# Patient Record
Sex: MALE | Race: WHITE | Employment: UNEMPLOYED | URBAN - METROPOLITAN AREA
[De-identification: names, ages, dates, MRNs, and addresses within clinical notes are randomized per-mention and may not be internally consistent; named-entity substitution may affect disease eponyms.]

---

## 2019-03-05 ENCOUNTER — APPOINTMENT (OUTPATIENT)
Dept: GENERAL RADIOLOGY | Age: 34
End: 2019-03-05
Attending: NURSE PRACTITIONER
Payer: MEDICAID

## 2019-03-05 ENCOUNTER — HOSPITAL ENCOUNTER (OUTPATIENT)
Age: 34
Setting detail: OBSERVATION
Discharge: HOME | End: 2019-03-05
Attending: EMERGENCY MEDICINE | Admitting: INTERNAL MEDICINE
Payer: MEDICAID

## 2019-03-05 VITALS
HEART RATE: 127 BPM | SYSTOLIC BLOOD PRESSURE: 100 MMHG | OXYGEN SATURATION: 100 % | DIASTOLIC BLOOD PRESSURE: 55 MMHG | TEMPERATURE: 99 F | HEIGHT: 71 IN | BODY MASS INDEX: 33.6 KG/M2 | WEIGHT: 240 LBS | RESPIRATION RATE: 24 BRPM

## 2019-03-05 DIAGNOSIS — R00.0 TACHYCARDIA: ICD-10-CM

## 2019-03-05 DIAGNOSIS — R51.9 ACUTE NONINTRACTABLE HEADACHE, UNSPECIFIED HEADACHE TYPE: ICD-10-CM

## 2019-03-05 DIAGNOSIS — J11.1 INFLUENZA-LIKE ILLNESS: ICD-10-CM

## 2019-03-05 DIAGNOSIS — R50.9 FEVER, UNSPECIFIED FEVER CAUSE: Primary | ICD-10-CM

## 2019-03-05 DIAGNOSIS — R11.2 NAUSEA AND VOMITING, INTRACTABILITY OF VOMITING NOT SPECIFIED, UNSPECIFIED VOMITING TYPE: ICD-10-CM

## 2019-03-05 DIAGNOSIS — G90.A POSTURAL ORTHOSTATIC TACHYCARDIA SYNDROME: ICD-10-CM

## 2019-03-05 LAB
ALBUMIN SERPL-MCNC: 4.4 G/DL (ref 3.4–5)
ALBUMIN/GLOB SERPL: 1.3 {RATIO} (ref 0.8–1.7)
ALP SERPL-CCNC: 82 U/L (ref 45–117)
ALT SERPL-CCNC: 34 U/L (ref 16–61)
ANION GAP SERPL CALC-SCNC: 8 MMOL/L (ref 3–18)
APPEARANCE UR: CLEAR
AST SERPL-CCNC: 22 U/L (ref 15–37)
BASOPHILS # BLD: 0 K/UL (ref 0–0.1)
BASOPHILS NFR BLD: 0 % (ref 0–2)
BILIRUB SERPL-MCNC: 0.7 MG/DL (ref 0.2–1)
BILIRUB UR QL: NEGATIVE
BNP SERPL-MCNC: 58 PG/ML (ref 0–450)
BUN SERPL-MCNC: 9 MG/DL (ref 7–18)
BUN/CREAT SERPL: 8 (ref 12–20)
CALCIUM SERPL-MCNC: 9 MG/DL (ref 8.5–10.1)
CHLORIDE SERPL-SCNC: 102 MMOL/L (ref 100–108)
CK MB CFR SERPL CALC: NORMAL % (ref 0–4)
CK MB SERPL-MCNC: <1 NG/ML (ref 5–25)
CK SERPL-CCNC: 56 U/L (ref 39–308)
CO2 SERPL-SCNC: 27 MMOL/L (ref 21–32)
COLOR UR: YELLOW
CREAT SERPL-MCNC: 1.18 MG/DL (ref 0.6–1.3)
DIFFERENTIAL METHOD BLD: ABNORMAL
EOSINOPHIL # BLD: 0.1 K/UL (ref 0–0.4)
EOSINOPHIL NFR BLD: 1 % (ref 0–5)
ERYTHROCYTE [DISTWIDTH] IN BLOOD BY AUTOMATED COUNT: 13.2 % (ref 11.6–14.5)
FLUAV AG NPH QL IA: NEGATIVE
FLUBV AG NOSE QL IA: NEGATIVE
GLOBULIN SER CALC-MCNC: 3.4 G/DL (ref 2–4)
GLUCOSE SERPL-MCNC: 105 MG/DL (ref 74–99)
GLUCOSE UR STRIP.AUTO-MCNC: NEGATIVE MG/DL
HCT VFR BLD AUTO: 49.1 % (ref 36–48)
HGB BLD-MCNC: 16.5 G/DL (ref 13–16)
HGB UR QL STRIP: NEGATIVE
KETONES UR QL STRIP.AUTO: NEGATIVE MG/DL
LACTATE BLD-SCNC: 1.52 MMOL/L (ref 0.4–2)
LACTATE BLD-SCNC: 2.19 MMOL/L (ref 0.4–2)
LEUKOCYTE ESTERASE UR QL STRIP.AUTO: NEGATIVE
LYMPHOCYTES # BLD: 0.4 K/UL (ref 0.9–3.6)
LYMPHOCYTES NFR BLD: 5 % (ref 21–52)
MCH RBC QN AUTO: 29.4 PG (ref 24–34)
MCHC RBC AUTO-ENTMCNC: 33.6 G/DL (ref 31–37)
MCV RBC AUTO: 87.5 FL (ref 74–97)
MONOCYTES # BLD: 0.6 K/UL (ref 0.05–1.2)
MONOCYTES NFR BLD: 7 % (ref 3–10)
NEUTS SEG # BLD: 7.5 K/UL (ref 1.8–8)
NEUTS SEG NFR BLD: 87 % (ref 40–73)
NITRITE UR QL STRIP.AUTO: NEGATIVE
PH UR STRIP: 8 [PH] (ref 5–8)
PLATELET # BLD AUTO: 208 K/UL (ref 135–420)
PMV BLD AUTO: 11.1 FL (ref 9.2–11.8)
POTASSIUM SERPL-SCNC: 3.7 MMOL/L (ref 3.5–5.5)
PROT SERPL-MCNC: 7.8 G/DL (ref 6.4–8.2)
PROT UR STRIP-MCNC: NEGATIVE MG/DL
RBC # BLD AUTO: 5.61 M/UL (ref 4.7–5.5)
SODIUM SERPL-SCNC: 137 MMOL/L (ref 136–145)
SP GR UR REFRACTOMETRY: 1.01 (ref 1–1.03)
TROPONIN I SERPL-MCNC: <0.02 NG/ML (ref 0–0.04)
UROBILINOGEN UR QL STRIP.AUTO: 0.2 EU/DL (ref 0.2–1)
WBC # BLD AUTO: 8.5 K/UL (ref 4.6–13.2)

## 2019-03-05 PROCEDURE — 83605 ASSAY OF LACTIC ACID: CPT

## 2019-03-05 PROCEDURE — 93005 ELECTROCARDIOGRAM TRACING: CPT

## 2019-03-05 PROCEDURE — 74011250637 HC RX REV CODE- 250/637: Performed by: NURSE PRACTITIONER

## 2019-03-05 PROCEDURE — 96375 TX/PRO/DX INJ NEW DRUG ADDON: CPT

## 2019-03-05 PROCEDURE — 99218 HC RM OBSERVATION: CPT

## 2019-03-05 PROCEDURE — 85025 COMPLETE CBC W/AUTO DIFF WBC: CPT

## 2019-03-05 PROCEDURE — 81003 URINALYSIS AUTO W/O SCOPE: CPT

## 2019-03-05 PROCEDURE — 80053 COMPREHEN METABOLIC PANEL: CPT

## 2019-03-05 PROCEDURE — 74011000250 HC RX REV CODE- 250: Performed by: NURSE PRACTITIONER

## 2019-03-05 PROCEDURE — 96361 HYDRATE IV INFUSION ADD-ON: CPT

## 2019-03-05 PROCEDURE — 87040 BLOOD CULTURE FOR BACTERIA: CPT

## 2019-03-05 PROCEDURE — 87804 INFLUENZA ASSAY W/OPTIC: CPT

## 2019-03-05 PROCEDURE — 96365 THER/PROPH/DIAG IV INF INIT: CPT

## 2019-03-05 PROCEDURE — 99285 EMERGENCY DEPT VISIT HI MDM: CPT

## 2019-03-05 PROCEDURE — 71046 X-RAY EXAM CHEST 2 VIEWS: CPT

## 2019-03-05 PROCEDURE — 74011250636 HC RX REV CODE- 250/636: Performed by: NURSE PRACTITIONER

## 2019-03-05 PROCEDURE — 87081 CULTURE SCREEN ONLY: CPT

## 2019-03-05 PROCEDURE — 82550 ASSAY OF CK (CPK): CPT

## 2019-03-05 PROCEDURE — 83880 ASSAY OF NATRIURETIC PEPTIDE: CPT

## 2019-03-05 RX ORDER — OSELTAMIVIR PHOSPHATE 75 MG/1
75 CAPSULE ORAL 2 TIMES DAILY
Qty: 10 CAP | Refills: 0 | Status: SHIPPED | OUTPATIENT
Start: 2019-03-05 | End: 2019-03-10

## 2019-03-05 RX ORDER — SODIUM CHLORIDE 0.9 % (FLUSH) 0.9 %
5-10 SYRINGE (ML) INJECTION AS NEEDED
Status: DISCONTINUED | OUTPATIENT
Start: 2019-03-05 | End: 2019-03-05 | Stop reason: HOSPADM

## 2019-03-05 RX ORDER — ONDANSETRON 2 MG/ML
4 INJECTION INTRAMUSCULAR; INTRAVENOUS
Status: COMPLETED | OUTPATIENT
Start: 2019-03-05 | End: 2019-03-05

## 2019-03-05 RX ORDER — ACETAMINOPHEN 325 MG/1
650 TABLET ORAL
Status: DISCONTINUED | OUTPATIENT
Start: 2019-03-05 | End: 2019-03-05 | Stop reason: HOSPADM

## 2019-03-05 RX ORDER — ENOXAPARIN SODIUM 100 MG/ML
40 INJECTION SUBCUTANEOUS EVERY 24 HOURS
Status: DISCONTINUED | OUTPATIENT
Start: 2019-03-05 | End: 2019-03-05 | Stop reason: HOSPADM

## 2019-03-05 RX ORDER — IBUPROFEN 600 MG/1
600 TABLET ORAL
Status: COMPLETED | OUTPATIENT
Start: 2019-03-05 | End: 2019-03-05

## 2019-03-05 RX ORDER — SODIUM CHLORIDE 9 MG/ML
150 INJECTION, SOLUTION INTRAVENOUS CONTINUOUS
Status: DISCONTINUED | OUTPATIENT
Start: 2019-03-05 | End: 2019-03-05

## 2019-03-05 RX ORDER — ONDANSETRON 2 MG/ML
4 INJECTION INTRAMUSCULAR; INTRAVENOUS
Status: DISCONTINUED | OUTPATIENT
Start: 2019-03-05 | End: 2019-03-05 | Stop reason: HOSPADM

## 2019-03-05 RX ORDER — ACETAMINOPHEN 325 MG/1
650 TABLET ORAL
Status: COMPLETED | OUTPATIENT
Start: 2019-03-05 | End: 2019-03-05

## 2019-03-05 RX ORDER — IBUPROFEN 400 MG/1
400 TABLET ORAL
Status: DISCONTINUED | OUTPATIENT
Start: 2019-03-05 | End: 2019-03-05 | Stop reason: HOSPADM

## 2019-03-05 RX ADMIN — SODIUM CHLORIDE 1000 ML: 900 INJECTION, SOLUTION INTRAVENOUS at 14:12

## 2019-03-05 RX ADMIN — IBUPROFEN 600 MG: 600 TABLET, FILM COATED ORAL at 16:03

## 2019-03-05 RX ADMIN — SODIUM CHLORIDE 1000 ML: 900 INJECTION, SOLUTION INTRAVENOUS at 14:11

## 2019-03-05 RX ADMIN — ONDANSETRON 4 MG: 2 INJECTION INTRAMUSCULAR; INTRAVENOUS at 15:49

## 2019-03-05 RX ADMIN — SODIUM CHLORIDE 500 MG: 900 INJECTION, SOLUTION INTRAVENOUS at 15:41

## 2019-03-05 RX ADMIN — SODIUM CHLORIDE 259 ML: 900 INJECTION, SOLUTION INTRAVENOUS at 15:49

## 2019-03-05 RX ADMIN — SODIUM CHLORIDE 150 ML/HR: 900 INJECTION, SOLUTION INTRAVENOUS at 18:14

## 2019-03-05 RX ADMIN — WATER 2 G: 1 INJECTION INTRAMUSCULAR; INTRAVENOUS; SUBCUTANEOUS at 14:31

## 2019-03-05 RX ADMIN — ACETAMINOPHEN 650 MG: 325 TABLET ORAL at 14:31

## 2019-03-05 NOTE — PROGRESS NOTES
Went to see patient. Debating whether or not he wants to stay as he is feeling better. Will stand by. If he stays, I have orders and H+P in [facility]" mode ready. Await decision. ....     Dr. Meg Ly

## 2019-03-05 NOTE — ROUTINE PROCESS
[facility] Medic Code Sepsis  -   - Time of RRT: N/A  - Time of Code Sepsis: 3511  - Team:  Physician Dr. Dona Madera  Medic RRTM EVI  Supervisor KELLEY Triana  - SIRS # 1 Temp 102.6  SIRS # 2 HR- 140  - Possible source of infection: Unknown, (FLU?)  - Organ dysfunction related to sepsis: POC LA > 2  - Labs:  (pull in Chem 7 and CBC)  - Initial Vitals: (pull in from EPIC)  - Blood Cultures collected times 2 OR collected within last 24 hours:  1ST SET @ 1400, 2NS SET @ 1423  - Lactic Acid Collection time OR within last 6 hours: 1406  - Antibiotic Start time: 1431  - Lactic Acid Result: 2.19  - Target Fluid Bolus Amount: 2,259 ML  - Time of Fluid Bolus initiated: 7703 & 9394, (2- 1 Liter  BAGS)  - Reason for no target fluid bolus:   - New PIV / or line:    -  Time of Fluid Bolus Completion: 1610  - Cardiopulmonary response after fluid completion:   - Time of Repeat Lactic Acid: 1610  - Repeat Lactic Acid Result: 1.52  - Repeat Vitals: (pull in from EPIC)  - Vasopressors Needed? - Debriefed with RN MELISSA Villarreal states she needs no further assistance. - Additional Notes:  - Transfer to higher level of care?

## 2019-03-05 NOTE — ROUTINE PROCESS
TRANSFER - IN REPORT:    Verbal report received from Dwight Mendenhall RN(name) on Carolina Snowball  being received from ED(unit) for routine progression of care      Report consisted of patients Situation, Background, Assessment and   Recommendations(SBAR). Information from the following report(s) SBAR, Kardex, ED Summary, Procedure Summary, Intake/Output, MAR, Accordion and Recent Results was reviewed with the receiving nurse. Opportunity for questions and clarification was provided. Assessment completed upon patients arrival to unit and care assumed.

## 2019-03-05 NOTE — ED PROVIDER NOTES
EMERGENCY DEPARTMENT HISTORY AND PHYSICAL EXAM    Date: 3/5/2019  Patient Name: Alberto Pickering    History of Presenting Illness     Chief Complaint   Patient presents with    Fever         History Provided By: Patient    Chief Complaint: fever  Duration: 1 Days  Timing:  Progressive  Location: Constitutional  Quality: TMax 102.6F  Severity: Mild  Associated Symptoms: nausea, bilateral flank pain, HA, and cough    Additional History (Context):   2:04 PM  Alberto Pickering is a 29 y.o. male with PMHX of POTS, kidney stones who presents to the emergency department C/O progressive fever (TMax 102.6F), onset today. Associated sxs include nausea, HA, bilateral flank pain, frequency, chills, and cough. Pt is not UTD on flu vaccine. Pt has been previously tested for heart failure, but has not been diagnosed with any heart conditions. Pt denies vomiting, diarrhea, CP, neck stiffness, sore throat, dysuria, hematuria, use of Tylenol or Motrin, PMHx of heart failure and any other sxs or complaints. PCP: Dangelo Gregorio MD        Past History     Past Medical History:  Past Medical History:   Diagnosis Date    POTS (postural orthostatic tachycardia syndrome)        Past Surgical History:  History reviewed. No pertinent surgical history. Family History:  History reviewed. No pertinent family history. Social History:  Social History     Tobacco Use    Smoking status: Never Smoker    Smokeless tobacco: Never Used   Substance Use Topics    Alcohol use: No    Drug use: No       Allergies: Allergies   Allergen Reactions    Iodinated Contrast- Oral And Iv Dye Other (comments)         Review of Systems   Review of Systems   Constitutional: Positive for chills and fever. Respiratory: Positive for cough. Cardiovascular: Negative for chest pain. Gastrointestinal: Positive for nausea. Negative for diarrhea and vomiting. Genitourinary: Positive for flank pain (bilateral) and frequency.  Negative for dysuria and hematuria. Musculoskeletal: Negative for neck stiffness. Neurological: Positive for headaches. All other systems reviewed and are negative. Physical Exam     Vitals:    03/05/19 1358 03/05/19 1547 03/05/19 1700 03/05/19 1745   BP: 143/83   100/55   Pulse: (!) 140 (!) 130 (!) 125 (!) 127   Resp: 22 19 20 24   Temp: (!) 102.6 °F (39.2 °C) 99.9 °F (37.7 °C)  99 °F (37.2 °C)   SpO2: 97% 100%     Weight: 108.9 kg (240 lb)      Height: 5' 11\" (1.803 m)        Physical Exam   Constitutional: He is oriented to person, place, and time. He appears well-developed and well-nourished. Non-toxic in appearance, NAD   HENT:   Head: Normocephalic and atraumatic. Right Ear: Hearing and tympanic membrane normal.   Left Ear: Hearing and tympanic membrane normal.   Nose: Nose normal.   Mouth/Throat:       Eyes: Conjunctivae and EOM are normal. Pupils are equal, round, and reactive to light. Neck: Normal range of motion. Neck supple. No rigidity or TTP   Cardiovascular: Regular rhythm. Tachycardic   Pulmonary/Chest: Effort normal and breath sounds normal.   Abdominal: Soft. Bowel sounds are normal. There is no tenderness. There is no rebound and no guarding. Musculoskeletal: Normal range of motion. Neurological: He is alert and oriented to person, place, and time. Skin: Skin is warm and dry. Nursing note and vitals reviewed.          Diagnostic Study Results     Labs -     Recent Results (from the past 12 hour(s))   METABOLIC PANEL, COMPREHENSIVE    Collection Time: 03/05/19  2:00 PM   Result Value Ref Range    Sodium 137 136 - 145 mmol/L    Potassium 3.7 3.5 - 5.5 mmol/L    Chloride 102 100 - 108 mmol/L    CO2 27 21 - 32 mmol/L    Anion gap 8 3.0 - 18 mmol/L    Glucose 105 (H) 74 - 99 mg/dL    BUN 9 7.0 - 18 MG/DL    Creatinine 1.18 0.6 - 1.3 MG/DL    BUN/Creatinine ratio 8 (L) 12 - 20      GFR est AA >60 >60 ml/min/1.73m2    GFR est non-AA >60 >60 ml/min/1.73m2    Calcium 9.0 8.5 - 10.1 MG/DL    Bilirubin, total 0.7 0.2 - 1.0 MG/DL    ALT (SGPT) 34 16 - 61 U/L    AST (SGOT) 22 15 - 37 U/L    Alk. phosphatase 82 45 - 117 U/L    Protein, total 7.8 6.4 - 8.2 g/dL    Albumin 4.4 3.4 - 5.0 g/dL    Globulin 3.4 2.0 - 4.0 g/dL    A-G Ratio 1.3 0.8 - 1.7     CBC WITH AUTOMATED DIFF    Collection Time: 03/05/19  2:00 PM   Result Value Ref Range    WBC 8.5 4.6 - 13.2 K/uL    RBC 5.61 (H) 4.70 - 5.50 M/uL    HGB 16.5 (H) 13.0 - 16.0 g/dL    HCT 49.1 (H) 36.0 - 48.0 %    MCV 87.5 74.0 - 97.0 FL    MCH 29.4 24.0 - 34.0 PG    MCHC 33.6 31.0 - 37.0 g/dL    RDW 13.2 11.6 - 14.5 %    PLATELET 878 142 - 428 K/uL    MPV 11.1 9.2 - 11.8 FL    NEUTROPHILS 87 (H) 40 - 73 %    LYMPHOCYTES 5 (L) 21 - 52 %    MONOCYTES 7 3 - 10 %    EOSINOPHILS 1 0 - 5 %    BASOPHILS 0 0 - 2 %    ABS. NEUTROPHILS 7.5 1.8 - 8.0 K/UL    ABS. LYMPHOCYTES 0.4 (L) 0.9 - 3.6 K/UL    ABS. MONOCYTES 0.6 0.05 - 1.2 K/UL    ABS. EOSINOPHILS 0.1 0.0 - 0.4 K/UL    ABS. BASOPHILS 0.0 0.0 - 0.1 K/UL    DF AUTOMATED     CARDIAC PANEL,(CK, CKMB & TROPONIN)    Collection Time: 03/05/19  2:00 PM   Result Value Ref Range    CK 56 39 - 308 U/L    CK - MB <1.0 <3.6 ng/ml    CK-MB Index  0.0 - 4.0 %     CALCULATION NOT PERFORMED WHEN RESULT IS BELOW LINEAR LIMIT    Troponin-I, QT <0.02 0.0 - 0.045 NG/ML   NT-PRO BNP    Collection Time: 03/05/19  2:00 PM   Result Value Ref Range    NT pro-BNP 58 0 - 450 PG/ML   POC LACTIC ACID    Collection Time: 03/05/19  2:06 PM   Result Value Ref Range    Lactic Acid (POC) 2.19 (HH) 0.40 - 2.00 mmol/L   EKG, 12 LEAD, INITIAL    Collection Time: 03/05/19  2:08 PM   Result Value Ref Range    Ventricular Rate 143 BPM    Atrial Rate 143 BPM    P-R Interval 138 ms    QRS Duration 102 ms    Q-T Interval 270 ms    QTC Calculation (Bezet) 416 ms    Calculated P Axis 46 degrees    Calculated R Axis -6 degrees    Calculated T Axis 40 degrees    Diagnosis       Sinus tachycardia  Otherwise normal ECG  When compared with ECG of 04-SEP-2011 23:50,  Vent. rate has increased BY  57 BPM     INFLUENZA A & B AG (RAPID TEST)    Collection Time: 03/05/19  2:10 PM   Result Value Ref Range    Influenza A Antigen NEGATIVE  NEG      Influenza B Antigen NEGATIVE  NEG     STREP THROAT SCREEN    Collection Time: 03/05/19  2:20 PM   Result Value Ref Range    Special Requests: NO SPECIAL REQUESTS      Strep Screen NEGATIVE       Strep Screen (NOTE)  TEST PERFORMED BY THE [facility] ED ON 3/5/19. Culture result: PENDING    URINALYSIS W/ RFLX MICROSCOPIC    Collection Time: 03/05/19  4:00 PM   Result Value Ref Range    Color YELLOW      Appearance CLEAR      Specific gravity 1.009 1.005 - 1.030      pH (UA) 8.0 5.0 - 8.0      Protein NEGATIVE  NEG mg/dL    Glucose NEGATIVE  NEG mg/dL    Ketone NEGATIVE  NEG mg/dL    Bilirubin NEGATIVE  NEG      Blood NEGATIVE  NEG      Urobilinogen 0.2 0.2 - 1.0 EU/dL    Nitrites NEGATIVE  NEG      Leukocyte Esterase NEGATIVE  NEG     POC LACTIC ACID    Collection Time: 03/05/19  4:10 PM   Result Value Ref Range    Lactic Acid (POC) 1.52 0.40 - 2.00 mmol/L       Radiologic Studies -   XR CHEST PA LAT   Final Result   IMPRESSION:      No acute cardiopulmonary disease. As read by the radiologist.   _______________        CT Results  (Last 48 hours)    None        CXR Results  (Last 48 hours)               03/05/19 1504  XR CHEST PA LAT Final result    Impression:  IMPRESSION:       No acute cardiopulmonary disease.       _______________       Narrative:  EXAM: XR CHEST PA LAT. CLINICAL INDICATION/HISTORY: fever.   -Additional: None. TECHNIQUE: AP and left lateral radiographic views of the chest are compared with   the radiographs of 09/05/2011.    _______________       FINDINGS:       Low inspiratory volumes likely account for apparent venous prominence. There is   no focal pulmonic consolidation, pneumothorax, or significant pleural effusion. The cardiac silhouette, sandrita, and mediastinal contours are normal for age.  No   acute osseous abnormality is revealed. _______________                 Medications given in the ED-  Medications   acetaminophen (TYLENOL) tablet 650 mg (650 mg Oral Given 3/5/19 1431)   sodium chloride 0.9 % bolus infusion 1,000 mL (0 mL IntraVENous IV Completed 3/5/19 1552)     Followed by   sodium chloride 0.9 % bolus infusion 1,000 mL (0 mL IntraVENous IV Completed 3/5/19 1829)     Followed by   sodium chloride 0.9 % bolus infusion 259 mL (0 mL IntraVENous IV Completed 3/5/19 1610)   ondansetron (ZOFRAN) injection 4 mg (4 mg IntraVENous Given 3/5/19 1549)   ibuprofen (MOTRIN) tablet 600 mg (600 mg Oral Given 3/5/19 1603)         Medical Decision Making   I am the first provider for this patient. I reviewed the vital signs, available nursing notes, past medical history, past surgical history, family history and social history. Vital Signs-Reviewed the patient's vital signs. Pulse Oximetry Analysis - 97% on room air     Cardiac Monitor:  Rate: 140 bpm  Rhythm: sinus tachycardia    EKG interpretation: (Preliminary)  2:08 PM   Sinus tachycardia with 143 bpm, no STEMI  EKG read by Gavin Cadena NP at 2:13 PM     Records Reviewed: Nursing Notes and Old Medical Records    Provider Notes (DDx):     Procedures:  Procedures    ED Course:   2:04 PM Initial assessment performed. The patients presenting problems have been discussed, and they are in agreement with the care plan formulated and outlined with them. I have encouraged them to ask questions as they arise throughout their visit.    2:06 PM Code sepsis called and sepsis protocol initiated     FACE-TO-FACE PROGRESS NOTE:  2:12 PM  The patient presents with fever and body aches. My exam shows tachycardia. Pt is nontoxic, NAD, and lungs clear  Imp/plan: Pt appears to have flu or flu like illness, will evaluate further. Lactic acid elevated, code sepsis initiated.     I have personally seen and examined the patient, reviewed the NAYELI's note and agree with findings and plan.  Written by Nicole Peña ED Scribe, as dictated by Fran Izaguirre MD.    5:55 PM  Pt updated on all results, continues to be tachycardiac 125-130 BPM and mildly hypotensive at 95/50, a known source of fever was suspected influenza vs viral. Pt is agreeable to admission. 6:15 PM: 6:15 PM Discussed patient's history, exam, and available diagnostics results with Dr. Kailyn Alcaraz, Kennard Pac), who agree with observation to Tele. 7:05 PM  I informed the pt that He needed admission for adequate evaluation for his symptoms, diagnosis, that by refusing admission, observation, workup He is at risk for death, arrhythmia. He is awake, alert, He understands his condition and the risks involved in leaving. He verbalized knowing the same risks and understood it was recommended that He stay and could also return at any time. his spouse/SO was present for the discussion and also verbalized that He understood both diagnosis, risks and could return at any time. They were both provided with warnings regarding worsening of his condition and were provided instructions to follow up with Leatha Renee MD tomorrow or return to the Emergency Room as soon as possible. This discussion was witnessed by Psychiatric, RN. Discussion: Pt presents with wife who is having similar sx including fever, cough and body aches. Pt has significant hx of POTS. Original lactic was elevated at 2.19. WBC is normal and repeat lactic after fluids was 1.52. He was given target fluids for ideal body weight and broad spectrum abx to cover PNA. No source of infection but strong clinical suspicion for influenza. Due to continued tachycardia despite IV fluids and anti-pyretics, I recommended admission. The pt ultimately refused as he needs to take care of his wife and felt better. He understands all possible negative repercussions, reasons to return and wishes to be discharged.  He does wish to be treated with Tamaflu, and understands the possible negative side effects of this medications. Diagnosis and Disposition     6:17 PM  I have spent 37 minutes of critical care time involved in lab review, consultations with specialist, family decision-making, and documentation. During this entire length of time I was immediately available to the patient. Critical Care: The reason for providing this level of medical care for this critically ill patient was due a critical illness that impaired one or more vital organ systems such that there was a high probability of imminent or life threatening deterioration in the patients condition. This care involved high complexity decision making to assess, manipulate, and support vital system functions, to treat this degree vital organ system failure and to prevent further life threatening deterioration of the patients condition. Critical Care Time: 37 minutes    DISCHARGE NOTE:  7:09 PM  Rakesh Ly's  results have been reviewed with him. He has been counseled regarding his diagnosis, treatment, and plan. He verbally conveys understanding and agreement of the signs, symptoms, diagnosis, treatment and prognosis and additionally agrees to follow up as discussed. He also agrees with the care-plan and conveys that all of his questions have been answered. I have also provided discharge instructions for him that include: educational information regarding their diagnosis and treatment, and list of reasons why they would want to return to the ED prior to their follow-up appointment, should his condition change. CLINICAL IMPRESSION:    1. Fever, unspecified fever cause    2. Acute nonintractable headache, unspecified headache type    3. Nausea and vomiting, intractability of vomiting not specified, unspecified vomiting type    4. Tachycardia    5. Influenza-like illness    6.  Postural orthostatic tachycardia syndrome        PLAN: DISCHARGE HOME    Follow-up Information     Follow up With Specialties Details Why Contact Info    Aidee Mehta MD Family Practice Schedule an appointment as soon as possible for a visit in 3 days For primary care follow up [address]  [phone]      Ethel Sosa MD Cardiology Schedule an appointment as soon as possible for a visit in 3 days For cardiology follow up [address] [facility]      [facility] EMERGENCY DEPT Emergency Medicine Go to As needed, if symptoms worsen [address] [address]  [phone]          Discharge Medication List as of 3/5/2019  7:09 PM      START taking these medications    Details   oseltamivir (TAMIFLU) 75 mg capsule Take 1 Cap by mouth two (2) times a day for 5 days. , Print, Disp-10 Cap, R-0             _______________________________    Attestations: This note is prepared by Brooke Knight and Sue Henderson, acting as Scribe for Peter Yang NP. Peter Yang NP:  The scribe's documentation has been prepared under my direction and personally reviewed by me in its entirety. I confirm that the note above accurately reflects all work, treatment, procedures, and medical decision making performed by me. This note is prepared by Nicole Peña, acting as Scribe for Fran Izaguirre MD.    Fran Izaguirre MD:  The scribe's documentation has been prepared under my direction and personally reviewed by me in its entirety.   I confirm that the note above accurately reflects all work, treatment, procedures, and medical decision making performed by me.  _______________________________

## 2019-03-05 NOTE — ED NOTES
TRANSFER - OUT REPORT:    Verbal report given to  [facility] en (name) on Portillo Stage  being transferred to 359(unit) for routine progression of care       Report consisted of patients Situation, Background, Assessment and   Recommendations(SBAR). Information from the following report(s) SBAR, Kardex and ED Summary was reviewed with the receiving nurse. Lines:   Peripheral IV 03/05/19 Left Antecubital (Active)   Site Assessment Clean, dry, & intact 3/5/2019  2:00 PM   Phlebitis Assessment 0 3/5/2019  2:00 PM   Infiltration Assessment 0 3/5/2019  2:00 PM   Dressing Status Clean, dry, & intact 3/5/2019  2:00 PM   Dressing Type Transparent 3/5/2019  2:00 PM        Opportunity for questions and clarification was provided.       Patient transported with:   Registered Nurse

## 2019-03-05 NOTE — H&P
History & Physical    Patient: Diana Cruz MRN: [id number]  CSN: [id number]    YOB: 1985  Age: 29 y.o. Sex: male      DOA: 3/5/2019  Primary Care Provider:  Meryl Carreno MD      Assessment/Plan     Patient Active Problem List   Diagnosis Code    POTS (postural orthostatic tachycardia syndrome) R00.0, I95.1       30 y/o male with seeming viral syndrome. Wife with same. No discrete focus of infection. Nothing to suggest bacterial infection at this time  Tested negative for influenza on rapid testing whose sensitivity is sub-optimal. I think its sensitivity is somewhere around 70/%. I think this young man has a viral syndrome, flu or flu-like syndrome. As his tachycardiac has not resolved, we will bring in overnight for fluids. I will check a pcr flu. Continue the abx started in the er overnight. Diagnoses:      Flu-like syndrome  Tachycardia  Dehydration. SIRS due to infectious agent (liekly viral)    CC: Fever       HPI:     Diana Cruz is a 29 y.o. male who presents with his wife to the ER with  Fever, chills, body aches and malaise. Tachycardic. Received ivf and abx. Flu a/b neg. Despite attempts at fluid resuscitation his heart rate has remained elevated. Past Medical History:   Diagnosis Date    POTS (postural orthostatic tachycardia syndrome)        History reviewed. No pertinent surgical history. History reviewed. No pertinent family history.     Social History     Socioeconomic History    Marital status:      Spouse name: Not on file    Number of children: Not on file    Years of education: Not on file    Highest education level: Not on file   Tobacco Use    Smoking status: Never Smoker    Smokeless tobacco: Never Used   Substance and Sexual Activity    Alcohol use: No    Drug use: No       Prior to Admission medications    Not on File       Allergies   Allergen Reactions    Iodinated Contrast- Oral And Iv Dye Other (comments)       Review of Systems  Gen: +fever, chills, malaise, no weight loss/gain. Heent: No headache, rhinorrhea, epistaxis, ear pain, hearing loss, sinus pain, neck pain/stiffness, sore throat. Heart: No chest pain, palpitations, MEZA, pnd, or orthopnea. Resp: No cough, hemoptysis, wheezing and shortness of breath. GI:  + nausea, no vomiting, diarrhea, constipation, melena or hematochezia. : No urinary obstruction, dysuria or hematuria. Derm: No rash, new skin lesion or pruritis. Musc/skeletal: no bone or joint complains. Vasc: No edema, cyanosis or claudication. Endo: No heat/cold intolerance, no polyuria,polydipsia or polyphagia. Neuro: No unilateral weakness, numbness, tingling. No seizures. Heme: No easy bruising or bleeding. Physical Exam:     Physical Exam:  Visit Vitals  /55   Pulse (!) 127   Temp 99 °F (37.2 °C)   Resp 24   Ht 5' 11\" (1.803 m)   Wt 108.9 kg (240 lb)   SpO2 100%   BMI 33.47 kg/m²      O2 Device: Room air    Temp (24hrs), Av °F (38.3 °C), Min:99 °F (37.2 °C), Max:102.6 °F (39.2 °C)    No intake/output data recorded. No intake/output data recorded. General:  Awake, cooperative, no distress. Head:  Normocephalic, without obvious abnormality, atraumatic. Eyes:  Conjunctivae/corneas clear, sclera anicteric, PERRL, EOMs intact. Nose: Nares normal. No drainage or sinus tenderness. Throat: Lips, mucosa, and tongue normal.    Neck: Supple, symmetrical, trachea midline, no adenopathy. Lungs:   Clear to auscultation bilaterally. Heart:  Regular rate and rhythm, S1, S2 normal, no murmur, click, rub or gallop. Abdomen: Soft, non-tender. Bowel sounds normal. No masses,  No organomegaly. Extremities: Extremities normal, atraumatic, no cyanosis or edema. Capillary refill normal.   Pulses: 2+ and symmetric all extremities. Skin: Skin color pink/pale/mottled/flushed, turgor normal. No rashes or lesions   Neurologic: CNII-XII intact.  No focal motor or sensory deficit. Labs Reviewed: All lab results for the last 24 hours reviewed. Recent Results (from the past 24 hour(s))   METABOLIC PANEL, COMPREHENSIVE    Collection Time: 03/05/19  2:00 PM   Result Value Ref Range    Sodium 137 136 - 145 mmol/L    Potassium 3.7 3.5 - 5.5 mmol/L    Chloride 102 100 - 108 mmol/L    CO2 27 21 - 32 mmol/L    Anion gap 8 3.0 - 18 mmol/L    Glucose 105 (H) 74 - 99 mg/dL    BUN 9 7.0 - 18 MG/DL    Creatinine 1.18 0.6 - 1.3 MG/DL    BUN/Creatinine ratio 8 (L) 12 - 20      GFR est AA >60 >60 ml/min/1.73m2    GFR est non-AA >60 >60 ml/min/1.73m2    Calcium 9.0 8.5 - 10.1 MG/DL    Bilirubin, total 0.7 0.2 - 1.0 MG/DL    ALT (SGPT) 34 16 - 61 U/L    AST (SGOT) 22 15 - 37 U/L    Alk. phosphatase 82 45 - 117 U/L    Protein, total 7.8 6.4 - 8.2 g/dL    Albumin 4.4 3.4 - 5.0 g/dL    Globulin 3.4 2.0 - 4.0 g/dL    A-G Ratio 1.3 0.8 - 1.7     CBC WITH AUTOMATED DIFF    Collection Time: 03/05/19  2:00 PM   Result Value Ref Range    WBC 8.5 4.6 - 13.2 K/uL    RBC 5.61 (H) 4.70 - 5.50 M/uL    HGB 16.5 (H) 13.0 - 16.0 g/dL    HCT 49.1 (H) 36.0 - 48.0 %    MCV 87.5 74.0 - 97.0 FL    MCH 29.4 24.0 - 34.0 PG    MCHC 33.6 31.0 - 37.0 g/dL    RDW 13.2 11.6 - 14.5 %    PLATELET 715 793 - 572 K/uL    MPV 11.1 9.2 - 11.8 FL    NEUTROPHILS 87 (H) 40 - 73 %    LYMPHOCYTES 5 (L) 21 - 52 %    MONOCYTES 7 3 - 10 %    EOSINOPHILS 1 0 - 5 %    BASOPHILS 0 0 - 2 %    ABS. NEUTROPHILS 7.5 1.8 - 8.0 K/UL    ABS. LYMPHOCYTES 0.4 (L) 0.9 - 3.6 K/UL    ABS. MONOCYTES 0.6 0.05 - 1.2 K/UL    ABS. EOSINOPHILS 0.1 0.0 - 0.4 K/UL    ABS.  BASOPHILS 0.0 0.0 - 0.1 K/UL    DF AUTOMATED     CARDIAC PANEL,(CK, CKMB & TROPONIN)    Collection Time: 03/05/19  2:00 PM   Result Value Ref Range    CK 56 39 - 308 U/L    CK - MB <1.0 <3.6 ng/ml    CK-MB Index  0.0 - 4.0 %     CALCULATION NOT PERFORMED WHEN RESULT IS BELOW LINEAR LIMIT    Troponin-I, QT <0.02 0.0 - 0.045 NG/ML   NT-PRO BNP    Collection Time: 03/05/19  2:00 PM Result Value Ref Range    NT pro-BNP 58 0 - 450 PG/ML   POC LACTIC ACID    Collection Time: 03/05/19  2:06 PM   Result Value Ref Range    Lactic Acid (POC) 2.19 (HH) 0.40 - 2.00 mmol/L   EKG, 12 LEAD, INITIAL    Collection Time: 03/05/19  2:08 PM   Result Value Ref Range    Ventricular Rate 143 BPM    Atrial Rate 143 BPM    P-R Interval 138 ms    QRS Duration 102 ms    Q-T Interval 270 ms    QTC Calculation (Bezet) 416 ms    Calculated P Axis 46 degrees    Calculated R Axis -6 degrees    Calculated T Axis 40 degrees    Diagnosis       Sinus tachycardia  Otherwise normal ECG  When compared with ECG of 04-SEP-2011 23:50,  Vent. rate has increased BY  57 BPM     INFLUENZA A & B AG (RAPID TEST)    Collection Time: 03/05/19  2:10 PM   Result Value Ref Range    Influenza A Antigen NEGATIVE  NEG      Influenza B Antigen NEGATIVE  NEG     STREP THROAT SCREEN    Collection Time: 03/05/19  2:20 PM   Result Value Ref Range    Special Requests: NO SPECIAL REQUESTS      Strep Screen NEGATIVE       Strep Screen (NOTE)  TEST PERFORMED BY THE [facility] ED ON 3/5/19.        Culture result: PENDING    URINALYSIS W/ RFLX MICROSCOPIC    Collection Time: 03/05/19  4:00 PM   Result Value Ref Range    Color YELLOW      Appearance CLEAR      Specific gravity 1.009 1.005 - 1.030      pH (UA) 8.0 5.0 - 8.0      Protein NEGATIVE  NEG mg/dL    Glucose NEGATIVE  NEG mg/dL    Ketone NEGATIVE  NEG mg/dL    Bilirubin NEGATIVE  NEG      Blood NEGATIVE  NEG      Urobilinogen 0.2 0.2 - 1.0 EU/dL    Nitrites NEGATIVE  NEG      Leukocyte Esterase NEGATIVE  NEG     POC LACTIC ACID    Collection Time: 03/05/19  4:10 PM   Result Value Ref Range    Lactic Acid (POC) 1.52 0.40 - 2.00 mmol/L             CC: Juan Luis River MD

## 2019-03-05 NOTE — ED NOTES
Attempted to call report to receiving RN. Prem Valentino RN unable to take report at this time will re attempted to call report shortly.

## 2019-03-06 NOTE — DISCHARGE INSTRUCTIONS
Follow up as directed  Alternate Tylenol every 4-6 hours or Motrin every 6-8 hours for fever control, Use caution when taking other OTC medications as they may also contain these medications  Increase oral fluid intake, get plenty of rest  Return to the ED for fever lasting greater then 3 days, lethargy, difficulty breathing or SOB, chest pain, fever not decreasing with medication or worsening of symptoms      Headache: Care Instructions  Your Care Instructions    Headaches have many possible causes. Most headaches aren't a sign of a more serious problem, and they will get better on their own. Home treatment may help you feel better faster. The doctor has checked you carefully, but problems can develop later. If you notice any problems or new symptoms, get medical treatment right away. Follow-up care is a key part of your treatment and safety. Be sure to make and go to all appointments, and call your doctor if you are having problems. It's also a good idea to know your test results and keep a list of the medicines you take. How can you care for yourself at home? · Do not drive if you have taken a prescription pain medicine. · Rest in a quiet, dark room until your headache is gone. Close your eyes and try to relax or go to sleep. Don't watch TV or read. · Put a cold, moist cloth or cold pack on the painful area for 10 to 20 minutes at a time. Put a thin cloth between the cold pack and your skin. · Use a warm, moist towel or a heating pad set on low to relax tight shoulder and neck muscles. · Have someone gently massage your neck and shoulders. · Take pain medicines exactly as directed. ? If the doctor gave you a prescription medicine for pain, take it as prescribed. ? If you are not taking a prescription pain medicine, ask your doctor if you can take an over-the-counter medicine.   · Be careful not to take pain medicine more often than the instructions allow, because you may get worse or more frequent headaches when the medicine wears off. · Do not ignore new symptoms that occur with a headache, such as a fever, weakness or numbness, vision changes, or confusion. These may be signs of a more serious problem. To prevent headaches  · Keep a headache diary so you can figure out what triggers your headaches. Avoiding triggers may help you prevent headaches. Record when each headache began, how long it lasted, and what the pain was like (throbbing, aching, stabbing, or dull). Write down any other symptoms you had with the headache, such as nausea, flashing lights or dark spots, or sensitivity to bright light or loud noise. Note if the headache occurred near your period. List anything that might have triggered the headache, such as certain foods (chocolate, cheese, wine) or odors, smoke, bright light, stress, or lack of sleep. · Find healthy ways to deal with stress. Headaches are most common during or right after stressful times. Take time to relax before and after you do something that has caused a headache in the past.  · Try to keep your muscles relaxed by keeping good posture. Check your jaw, face, neck, and shoulder muscles for tension, and try relaxing them. When sitting at a desk, change positions often, and stretch for 30 seconds each hour. · Get plenty of sleep and exercise. · Eat regularly and well. Long periods without food can trigger a headache. · Treat yourself to a massage. Some people find that regular massages are very helpful in relieving tension. · Limit caffeine by not drinking too much coffee, tea, or soda. But don't quit caffeine suddenly, because that can also give you headaches. · Reduce eyestrain from computers by blinking frequently and looking away from the computer screen every so often. Make sure you have proper eyewear and that your monitor is set up properly, about an arm's length away. · Seek help if you have depression or anxiety.  Your headaches may be linked to these conditions. Treatment can both prevent headaches and help with symptoms of anxiety or depression. When should you call for help? Call 911 anytime you think you may need emergency care. For example, call if:    · You have signs of a stroke. These may include:  ? Sudden numbness, paralysis, or weakness in your face, arm, or leg, especially on only one side of your body. ? Sudden vision changes. ? Sudden trouble speaking. ? Sudden confusion or trouble understanding simple statements. ? Sudden problems with walking or balance. ? A sudden, severe headache that is different from past headaches.    Call your doctor now or seek immediate medical care if:    · You have a new or worse headache.     · Your headache gets much worse. Where can you learn more? Go to http://charles-rohit.info/. Enter M271 in the search box to learn more about \"Headache: Care Instructions. \"  Current as of: Ying 3, 2018  Content Version: 11.9  © 6945-1633 [employer] Care instructions adapted under license by [employer] (which disclaims liability or warranty for this information). If you have questions about a medical condition or this instruction, always ask your healthcare professional. [place] [address] any warranty or liability for your use of this information. Learning About Fever  What is a fever? A fever is a high body temperature. It's one way your body fights being sick. A fever shows that the body is responding to infection or other illnesses, both minor and severe. A fever is a symptom, not an illness by itself. A fever can be a sign that you are ill, but most fevers are not caused by a serious problem. You may have a fever with a minor illness, such as a cold. But sometimes a very serious infection may cause little or no fever.  It is important to look at other symptoms, other conditions you have, and how you feel in general. In children, notice how they act and see what symptoms they complain of. What is a normal body temperature? A normal body temperature is about 98. 6ºF. Some people have a normal temperature that is a little higher or a little lower than this. Your temperature may be a little lower in the morning than it is later in the day. It may go up during hot weather or when you exercise, wear heavy clothes, or take a hot bath. Your temperature may also be different depending on how you take it. A temperature taken in the mouth (oral) or under the arm may be a little lower than your core temperature (rectal). What is a fever temperature? A core temperature of 100.4°F or above is considered a fever. What can cause a fever? A fever may be caused by:  · Infections. This is the most common cause of a fever. Examples of infections that can cause a fever include the flu, a kidney infection, or pneumonia. · Some medicines. · Severe trauma or injury, such as a heart attack, stroke, heatstroke, or burns. · Other medical conditions, such as arthritis and some cancers. How can you treat a fever at home? · Ask your doctor if you can take an over-the-counter pain medicine, such as acetaminophen (Tylenol), ibuprofen (Advil, Motrin), or naproxen (Aleve). Be safe with medicines. Read and follow all instructions on the label. · To prevent dehydration, drink plenty of fluids. Choose water and other caffeine-free clear liquids until you feel better. If you have kidney, heart, or liver disease and have to limit fluids, talk with your doctor before you increase the amount of fluids you drink. Follow-up care is a key part of your treatment and safety. Be sure to make and go to all appointments, and call your doctor if you are having problems. It's also a good idea to know your test results and keep a list of the medicines you take. Where can you learn more? Go to http://charles-rohit.info/.   Enter T589 in the search box to learn more about \"Learning About Fever. \"  Current as of: September 23, 2018  Content Version: 11.9  © 3853-7773 [employer]. Care instructions adapted under license by [employer] (which disclaims liability or warranty for this information). If you have questions about a medical condition or this instruction, always ask your healthcare professional. [place] [address] any warranty or liability for your use of this information.

## 2019-03-07 LAB
B-HEM STREP THROAT QL CULT: NEGATIVE
B-HEM STREP THROAT QL CULT: NORMAL
BACTERIA SPEC CULT: NORMAL
SERVICE CMNT-IMP: NORMAL

## 2019-03-11 LAB
BACTERIA SPEC CULT: NORMAL
BACTERIA SPEC CULT: NORMAL
SERVICE CMNT-IMP: NORMAL
SERVICE CMNT-IMP: NORMAL

## 2019-05-14 LAB
ATRIAL RATE: 143 BPM
CALCULATED P AXIS, ECG09: 46 DEGREES
CALCULATED R AXIS, ECG10: -6 DEGREES
CALCULATED T AXIS, ECG11: 40 DEGREES
DIAGNOSIS, 93000: NORMAL
P-R INTERVAL, ECG05: 138 MS
Q-T INTERVAL, ECG07: 270 MS
QRS DURATION, ECG06: 102 MS
QTC CALCULATION (BEZET), ECG08: 416 MS
VENTRICULAR RATE, ECG03: 143 BPM

## 2022-03-16 ENCOUNTER — HOSPITAL ENCOUNTER (OUTPATIENT)
Dept: PREADMISSION TESTING | Age: 37
Discharge: HOME | End: 2022-03-16
Payer: MEDICAID

## 2022-03-16 ENCOUNTER — HOSPITAL ENCOUNTER (OUTPATIENT)
Dept: PREADMISSION TESTING | Age: 37
Discharge: HOME | End: 2022-03-16

## 2022-03-16 VITALS — HEIGHT: 71 IN | BODY MASS INDEX: 32.9 KG/M2 | WEIGHT: 235 LBS

## 2022-03-16 LAB
ATRIAL RATE: 67 BPM
CALCULATED P AXIS, ECG09: 58 DEGREES
CALCULATED R AXIS, ECG10: 38 DEGREES
CALCULATED T AXIS, ECG11: 52 DEGREES
DIAGNOSIS, 93000: NORMAL
P-R INTERVAL, ECG05: 142 MS
Q-T INTERVAL, ECG07: 382 MS
QRS DURATION, ECG06: 108 MS
QTC CALCULATION (BEZET), ECG08: 403 MS
SARS-COV-2, NAA: NOT DETECTED
VENTRICULAR RATE, ECG03: 67 BPM

## 2022-03-16 PROCEDURE — U0003 INFECTIOUS AGENT DETECTION BY NUCLEIC ACID (DNA OR RNA); SEVERE ACUTE RESPIRATORY SYNDROME CORONAVIRUS 2 (SARS-COV-2) (CORONAVIRUS DISEASE [COVID-19]), AMPLIFIED PROBE TECHNIQUE, MAKING USE OF HIGH THROUGHPUT TECHNOLOGIES AS DESCRIBED BY CMS-2020-01-R: HCPCS

## 2022-03-16 PROCEDURE — 93005 ELECTROCARDIOGRAM TRACING: CPT

## 2022-03-16 RX ORDER — HYDROCODONE BITARTRATE AND ACETAMINOPHEN 5; 325 MG/1; MG/1
TABLET ORAL AS NEEDED
Status: ON HOLD | COMMUNITY
End: 2022-03-18 | Stop reason: SDUPTHER

## 2022-03-16 RX ORDER — ACETAMINOPHEN 500 MG
1000 TABLET ORAL
COMMUNITY

## 2022-03-16 RX ORDER — SODIUM CHLORIDE, SODIUM LACTATE, POTASSIUM CHLORIDE, CALCIUM CHLORIDE 600; 310; 30; 20 MG/100ML; MG/100ML; MG/100ML; MG/100ML
125 INJECTION, SOLUTION INTRAVENOUS CONTINUOUS
Status: CANCELLED | OUTPATIENT
Start: 2022-03-16

## 2022-03-16 RX ORDER — IBUPROFEN 200 MG
800 TABLET ORAL AS NEEDED
COMMUNITY

## 2022-03-16 NOTE — PERIOP NOTES
No sleep apnea, removable prosthetic devices or family history of malignant hyperthermia. Care fusion kit and instructions reviewed. PCP is aware of the surgery. No participation in clinical trial or research study. Do not bring any valuables on DOS- jewelry, wallet, cash, laptop, medications. Does not meet criteria for special population at this time. Possible time delay day of surgery reviewed. Covid testing- done 3-. DNR status- none.

## 2022-03-18 ENCOUNTER — ANESTHESIA EVENT (OUTPATIENT)
Dept: SURGERY | Age: 37
End: 2022-03-18
Payer: MEDICAID

## 2022-03-18 ENCOUNTER — APPOINTMENT (OUTPATIENT)
Dept: GENERAL RADIOLOGY | Age: 37
End: 2022-03-18
Attending: UROLOGY
Payer: MEDICAID

## 2022-03-18 ENCOUNTER — HOSPITAL ENCOUNTER (OUTPATIENT)
Age: 37
Setting detail: OUTPATIENT SURGERY
Discharge: HOME | End: 2022-03-18
Attending: UROLOGY | Admitting: UROLOGY
Payer: MEDICAID

## 2022-03-18 ENCOUNTER — ANESTHESIA (OUTPATIENT)
Dept: SURGERY | Age: 37
End: 2022-03-18
Payer: MEDICAID

## 2022-03-18 VITALS
HEART RATE: 84 BPM | TEMPERATURE: 97.8 F | OXYGEN SATURATION: 95 % | RESPIRATION RATE: 19 BRPM | DIASTOLIC BLOOD PRESSURE: 94 MMHG | SYSTOLIC BLOOD PRESSURE: 143 MMHG

## 2022-03-18 DIAGNOSIS — N13.2 URETERAL STONE WITH HYDRONEPHROSIS: Primary | ICD-10-CM

## 2022-03-18 PROCEDURE — 74011000250 HC RX REV CODE- 250: Performed by: ANESTHESIOLOGY

## 2022-03-18 PROCEDURE — 74011000636 HC RX REV CODE- 636: Performed by: UROLOGY

## 2022-03-18 PROCEDURE — C2617 STENT, NON-COR, TEM W/O DEL: HCPCS | Performed by: UROLOGY

## 2022-03-18 PROCEDURE — C1894 INTRO/SHEATH, NON-LASER: HCPCS | Performed by: UROLOGY

## 2022-03-18 PROCEDURE — 2709999900 HC NON-CHARGEABLE SUPPLY: Performed by: UROLOGY

## 2022-03-18 PROCEDURE — C1758 CATHETER, URETERAL: HCPCS | Performed by: UROLOGY

## 2022-03-18 PROCEDURE — 74011250636 HC RX REV CODE- 250/636: Performed by: ANESTHESIOLOGY

## 2022-03-18 PROCEDURE — 77030012508 HC MSK AIRWY LMA AMBU -A: Performed by: ANESTHESIOLOGY

## 2022-03-18 PROCEDURE — 76060000032 HC ANESTHESIA 0.5 TO 1 HR: Performed by: UROLOGY

## 2022-03-18 PROCEDURE — 76210000063 HC OR PH I REC FIRST 0.5 HR: Performed by: UROLOGY

## 2022-03-18 PROCEDURE — 76210000021 HC REC RM PH II 0.5 TO 1 HR: Performed by: UROLOGY

## 2022-03-18 PROCEDURE — 74011250636 HC RX REV CODE- 250/636: Performed by: UROLOGY

## 2022-03-18 PROCEDURE — 76010000138 HC OR TIME 0.5 TO 1 HR: Performed by: UROLOGY

## 2022-03-18 PROCEDURE — 77030040361 HC SLV COMPR DVT MDII -B: Performed by: UROLOGY

## 2022-03-18 PROCEDURE — C1769 GUIDE WIRE: HCPCS | Performed by: UROLOGY

## 2022-03-18 PROCEDURE — 74011000258 HC RX REV CODE- 258: Performed by: UROLOGY

## 2022-03-18 PROCEDURE — 77030038846 HC SCPE URETSCP LITHOVUE DISP BSC -H: Performed by: UROLOGY

## 2022-03-18 DEVICE — URETERAL STENT
Type: IMPLANTABLE DEVICE | Site: URETER | Status: FUNCTIONAL
Brand: POLARIS™ ULTRA

## 2022-03-18 RX ORDER — HYDROCODONE BITARTRATE AND ACETAMINOPHEN 5; 325 MG/1; MG/1
1 TABLET ORAL
Qty: 15 TABLET | Refills: 0 | Status: SHIPPED | OUTPATIENT
Start: 2022-03-18 | End: 2022-03-25

## 2022-03-18 RX ORDER — SODIUM CHLORIDE 0.9 % (FLUSH) 0.9 %
5-40 SYRINGE (ML) INJECTION AS NEEDED
Status: DISCONTINUED | OUTPATIENT
Start: 2022-03-18 | End: 2022-03-18 | Stop reason: HOSPADM

## 2022-03-18 RX ORDER — INSULIN LISPRO 100 [IU]/ML
INJECTION, SOLUTION INTRAVENOUS; SUBCUTANEOUS ONCE
Status: DISCONTINUED | OUTPATIENT
Start: 2022-03-18 | End: 2022-03-18 | Stop reason: HOSPADM

## 2022-03-18 RX ORDER — DOCUSATE SODIUM 100 MG/1
100 CAPSULE, LIQUID FILLED ORAL 2 TIMES DAILY
Qty: 20 CAPSULE | Refills: 0 | Status: SHIPPED | OUTPATIENT
Start: 2022-03-18 | End: 2022-03-28

## 2022-03-18 RX ORDER — FENTANYL CITRATE 50 UG/ML
25 INJECTION, SOLUTION INTRAMUSCULAR; INTRAVENOUS
Status: DISCONTINUED | OUTPATIENT
Start: 2022-03-18 | End: 2022-03-18 | Stop reason: HOSPADM

## 2022-03-18 RX ORDER — DEXAMETHASONE SODIUM PHOSPHATE 4 MG/ML
INJECTION, SOLUTION INTRA-ARTICULAR; INTRALESIONAL; INTRAMUSCULAR; INTRAVENOUS; SOFT TISSUE AS NEEDED
Status: DISCONTINUED | OUTPATIENT
Start: 2022-03-18 | End: 2022-03-18 | Stop reason: HOSPADM

## 2022-03-18 RX ORDER — SODIUM CHLORIDE, SODIUM LACTATE, POTASSIUM CHLORIDE, CALCIUM CHLORIDE 600; 310; 30; 20 MG/100ML; MG/100ML; MG/100ML; MG/100ML
125 INJECTION, SOLUTION INTRAVENOUS CONTINUOUS
Status: DISCONTINUED | OUTPATIENT
Start: 2022-03-18 | End: 2022-03-18

## 2022-03-18 RX ORDER — CEPHALEXIN 500 MG/1
500 CAPSULE ORAL 2 TIMES DAILY
Qty: 10 CAPSULE | Refills: 0 | Status: SHIPPED | OUTPATIENT
Start: 2022-03-18 | End: 2022-03-23

## 2022-03-18 RX ORDER — GLYCOPYRROLATE 0.2 MG/ML
INJECTION INTRAMUSCULAR; INTRAVENOUS AS NEEDED
Status: DISCONTINUED | OUTPATIENT
Start: 2022-03-18 | End: 2022-03-18 | Stop reason: HOSPADM

## 2022-03-18 RX ORDER — HYDROCODONE BITARTRATE AND ACETAMINOPHEN 5; 325 MG/1; MG/1
1 TABLET ORAL
Qty: 15 TABLET | Refills: 0 | Status: SHIPPED | OUTPATIENT
Start: 2022-03-18 | End: 2022-03-18 | Stop reason: SDUPTHER

## 2022-03-18 RX ORDER — PHENAZOPYRIDINE HYDROCHLORIDE 200 MG/1
200 TABLET, FILM COATED ORAL
Qty: 15 TABLET | Refills: 0 | Status: SHIPPED | OUTPATIENT
Start: 2022-03-18 | End: 2022-03-18 | Stop reason: SDUPTHER

## 2022-03-18 RX ORDER — SODIUM CHLORIDE 9 MG/ML
125 INJECTION, SOLUTION INTRAVENOUS CONTINUOUS
Status: DISCONTINUED | OUTPATIENT
Start: 2022-03-18 | End: 2022-03-18 | Stop reason: HOSPADM

## 2022-03-18 RX ORDER — MAGNESIUM SULFATE 100 %
4 CRYSTALS MISCELLANEOUS AS NEEDED
Status: DISCONTINUED | OUTPATIENT
Start: 2022-03-18 | End: 2022-03-18 | Stop reason: HOSPADM

## 2022-03-18 RX ORDER — MIDAZOLAM HYDROCHLORIDE 1 MG/ML
INJECTION, SOLUTION INTRAMUSCULAR; INTRAVENOUS AS NEEDED
Status: DISCONTINUED | OUTPATIENT
Start: 2022-03-18 | End: 2022-03-18 | Stop reason: HOSPADM

## 2022-03-18 RX ORDER — DOCUSATE SODIUM 100 MG/1
100 CAPSULE, LIQUID FILLED ORAL 2 TIMES DAILY
Qty: 20 CAPSULE | Refills: 0 | Status: SHIPPED | OUTPATIENT
Start: 2022-03-18 | End: 2022-03-18 | Stop reason: SDUPTHER

## 2022-03-18 RX ORDER — PROPOFOL 10 MG/ML
INJECTION, EMULSION INTRAVENOUS AS NEEDED
Status: DISCONTINUED | OUTPATIENT
Start: 2022-03-18 | End: 2022-03-18 | Stop reason: HOSPADM

## 2022-03-18 RX ORDER — FENTANYL CITRATE 50 UG/ML
INJECTION, SOLUTION INTRAMUSCULAR; INTRAVENOUS AS NEEDED
Status: DISCONTINUED | OUTPATIENT
Start: 2022-03-18 | End: 2022-03-18 | Stop reason: HOSPADM

## 2022-03-18 RX ORDER — DEXTROSE MONOHYDRATE 100 MG/ML
0-250 INJECTION, SOLUTION INTRAVENOUS AS NEEDED
Status: DISCONTINUED | OUTPATIENT
Start: 2022-03-18 | End: 2022-03-18 | Stop reason: HOSPADM

## 2022-03-18 RX ORDER — SODIUM CHLORIDE 0.9 % (FLUSH) 0.9 %
5-40 SYRINGE (ML) INJECTION EVERY 8 HOURS
Status: DISCONTINUED | OUTPATIENT
Start: 2022-03-18 | End: 2022-03-18 | Stop reason: HOSPADM

## 2022-03-18 RX ORDER — FENTANYL CITRATE 50 UG/ML
25 INJECTION, SOLUTION INTRAMUSCULAR; INTRAVENOUS AS NEEDED
Status: DISCONTINUED | OUTPATIENT
Start: 2022-03-18 | End: 2022-03-18 | Stop reason: HOSPADM

## 2022-03-18 RX ORDER — SODIUM CHLORIDE, SODIUM LACTATE, POTASSIUM CHLORIDE, CALCIUM CHLORIDE 600; 310; 30; 20 MG/100ML; MG/100ML; MG/100ML; MG/100ML
100 INJECTION, SOLUTION INTRAVENOUS CONTINUOUS
Status: DISCONTINUED | OUTPATIENT
Start: 2022-03-18 | End: 2022-03-18 | Stop reason: HOSPADM

## 2022-03-18 RX ORDER — ONDANSETRON 2 MG/ML
INJECTION INTRAMUSCULAR; INTRAVENOUS AS NEEDED
Status: DISCONTINUED | OUTPATIENT
Start: 2022-03-18 | End: 2022-03-18 | Stop reason: HOSPADM

## 2022-03-18 RX ORDER — CEPHALEXIN 500 MG/1
500 CAPSULE ORAL 2 TIMES DAILY
Qty: 10 CAPSULE | Refills: 0 | Status: SHIPPED | OUTPATIENT
Start: 2022-03-18 | End: 2022-03-18 | Stop reason: SDUPTHER

## 2022-03-18 RX ORDER — LIDOCAINE HYDROCHLORIDE 20 MG/ML
INJECTION, SOLUTION EPIDURAL; INFILTRATION; INTRACAUDAL; PERINEURAL AS NEEDED
Status: DISCONTINUED | OUTPATIENT
Start: 2022-03-18 | End: 2022-03-18 | Stop reason: HOSPADM

## 2022-03-18 RX ORDER — PHENAZOPYRIDINE HYDROCHLORIDE 200 MG/1
200 TABLET, FILM COATED ORAL
Qty: 15 TABLET | Refills: 0 | Status: SHIPPED | OUTPATIENT
Start: 2022-03-18 | End: 2022-03-23

## 2022-03-18 RX ORDER — SODIUM CHLORIDE, SODIUM LACTATE, POTASSIUM CHLORIDE, CALCIUM CHLORIDE 600; 310; 30; 20 MG/100ML; MG/100ML; MG/100ML; MG/100ML
INJECTION, SOLUTION INTRAVENOUS
Status: DISCONTINUED | OUTPATIENT
Start: 2022-03-18 | End: 2022-03-18 | Stop reason: HOSPADM

## 2022-03-18 RX ORDER — NALOXONE HYDROCHLORIDE 0.4 MG/ML
0.2 INJECTION, SOLUTION INTRAMUSCULAR; INTRAVENOUS; SUBCUTANEOUS AS NEEDED
Status: DISCONTINUED | OUTPATIENT
Start: 2022-03-18 | End: 2022-03-18 | Stop reason: HOSPADM

## 2022-03-18 RX ADMIN — GLYCOPYRROLATE 0.2 MG: 0.2 INJECTION INTRAMUSCULAR; INTRAVENOUS at 18:27

## 2022-03-18 RX ADMIN — PROPOFOL 200 MG: 10 INJECTION, EMULSION INTRAVENOUS at 18:35

## 2022-03-18 RX ADMIN — DEXAMETHASONE SODIUM PHOSPHATE 4 MG: 4 INJECTION, SOLUTION INTRAMUSCULAR; INTRAVENOUS at 18:54

## 2022-03-18 RX ADMIN — SODIUM CHLORIDE, SODIUM LACTATE, POTASSIUM CHLORIDE, AND CALCIUM CHLORIDE: 600; 310; 30; 20 INJECTION, SOLUTION INTRAVENOUS at 19:19

## 2022-03-18 RX ADMIN — MIDAZOLAM 2 MG: 1 INJECTION INTRAMUSCULAR; INTRAVENOUS at 18:27

## 2022-03-18 RX ADMIN — FENTANYL CITRATE 50 MCG: 50 INJECTION, SOLUTION INTRAMUSCULAR; INTRAVENOUS at 18:35

## 2022-03-18 RX ADMIN — LIDOCAINE HYDROCHLORIDE 100 MG: 20 INJECTION, SOLUTION INTRAVENOUS at 18:35

## 2022-03-18 RX ADMIN — ONDANSETRON HYDROCHLORIDE 4 MG: 2 INJECTION INTRAMUSCULAR; INTRAVENOUS at 18:54

## 2022-03-18 RX ADMIN — SODIUM CHLORIDE 125 ML/HR: 9 INJECTION, SOLUTION INTRAVENOUS at 16:47

## 2022-03-18 RX ADMIN — CEFTRIAXONE SODIUM 1 G: 1 INJECTION, POWDER, FOR SOLUTION INTRAMUSCULAR; INTRAVENOUS at 18:37

## 2022-03-18 NOTE — PERIOP NOTES
Reviewed PTA medication list with patient/caregiver and patient/caregiver denies any additional medications.  Patient admits to having a responsible adult care for them for at least 24 hours after surgery.     Primary Nurse Othella Lennox, RN and Monica Heard RN performed a dual skin assessment on this patient Impairment noted- see wound doc flow sheet  Benny score is 23

## 2022-03-18 NOTE — DISCHARGE INSTRUCTIONS
2 [facility], Urology     [facility], [address], [address], [place]  Office: [phone]  Fax:    [phone], right ureteroscopy laser litho, right stent placement  __  Notify [place] [address] [facility] IMMEDIATELY if any of the following occur:     You are unable to urinate. Urgency to urinate is not uncommon.   You find yourself urinating small frequent amounts associated with severe lower abdominal discomfort.   Bright red blood with clots in the urine. Some reddish urine is not uncommon and should be treated with increasing the amount of fluids you drink.   Temperature above 101.5° and / or chills.   You are nauseous and / or vomiting and you cannot hold down any fluids.   Your pain is not controlled with the pain medication prescribed. Special Considerations:      Do not drive for at least 58-98 hours after the procedure and until you are no longer taking narcotic pain medication and you are able to move and react without hesitation.  You may return to work 24-48 hours.  _x_  No heavy lifting over 10 pounds & no strenuous exercise for 1 week.  _x_  Other instructions:  Drink 2 liters of water a day.  _x_  Our office will call you to make an appointment to remove your stent on a string. Please contact [place] [address] [facility] at [phone] or go to the nearest Emergency Department / Urgent Care facility for any other medical questions or concerns. DISCHARGE SUMMARY from Nurse    PATIENT INSTRUCTIONS:    After general anesthesia or intravenous sedation, for 24 hours or while taking prescription Narcotics:  · Limit your activities  · Do not drive and operate hazardous machinery  · Do not make important personal or business decisions  · Do  not drink alcoholic beverages  · If you have not urinated within 8 hours after discharge, please contact your surgeon on call.     Report the following to your surgeon:  · Excessive pain, swelling, redness or odor of or around the surgical area  · Temperature over 100.5  · Nausea and vomiting lasting longer than 4 hours or if unable to take medications  · Any signs of decreased circulation or nerve impairment to extremity: change in color, persistent  numbness, tingling, coldness or increase pain  · Any questions    What to do at Home:  Recommended activity: Activity as tolerated    If you experience any of the following symptoms as stated above, please follow up with Dr. Emperatriz Jacobsen. *  Please give a list of your current medications to your Primary Care Provider. *  Please update this list whenever your medications are discontinued, doses are      changed, or new medications (including over-the-counter products) are added. *  Please carry medication information at all times in case of emergency situations. These are general instructions for a healthy lifestyle:    No smoking/ No tobacco products/ Avoid exposure to second hand smoke  Surgeon General's Warning:  Quitting smoking now greatly reduces serious risk to your health. Obesity, smoking, and sedentary lifestyle greatly increases your risk for illness    A healthy diet, regular physical exercise & weight monitoring are important for maintaining a healthy lifestyle    You may be retaining fluid if you have a history of heart failure or if you experience any of the following symptoms:  Weight gain of 3 pounds or more overnight or 5 pounds in a week, increased swelling in our hands or feet or shortness of breath while lying flat in bed. Please call your doctor as soon as you notice any of these symptoms; do not wait until your next office visit. The discharge information has been reviewed with the patient and caregiver. The patient and caregiver verbalized understanding.   Discharge medications reviewed with the patient and caregiver and appropriate educational materials and side effects teaching were provided. Patient armband removed and shredded.     ___________________________________________________________________________________________________________________________________

## 2022-03-18 NOTE — OP NOTES
Patient: Elizabeth Schumacher  YOB: 1985  MRN: [id number]    Date of Procedure: 3/18/2022     Pre-Op Diagnosis: RIGHT URETERAL STONE    Post-Op Diagnosis: Same as preoperative diagnosis. Procedure(s):  CYSTOSCOPY, RIGHT URETEROSCOPY LASER LITHOTRIPSY WITH HOLMIUM, RIGHT STENT PLACEMENT WITH C-ARM    Surgeon(s):  Jory Fay MD    Surgical Assistant: None    Anesthesia: General     Estimated Blood Loss (mL): Minimal    Complications: None    Specimens: NONE    Implants:   Implant Name Type Inv. Item Serial No.  Lot No. LRB No. Used Action   DB3 Mobile Master [id number] Jaron Dennisnicolás  Jose Master DBL-PGTL [id number] -- POLARIS  [id number] UROLOGY_ [id number] Right 1 Implanted         Findings: GENTLE RETROGRADE PYELOGRAM SHOWED FILLING DEFECT PROXIMAL URETER/UPJ. VERY TIGHT URETER REQUIRING STEADY PUSHING OF THE URETEROSCOPE TO EVENTUALLY GET IT TO THE LEVEL OF THE STONE. On the day of operation, the patient was explained and understood all the risks, benefits, and alternatives of the procedure were explained to him and he was then brought to the operative theater and placed on the table in a supine position. General anesthetic was administered. Patient received preoperative antibiotics prior to the beginning of procedure. A time-out was performed. When the patient was sufficiently anesthetized, he was moved into the dorsal lithotomy position, and prepped and draped in a usual sterile fashion. We then inserted a 21-Tamazight cystoscope with a 30 degree lens in an atraumatic fashion into the patient's transurethrally into the bladder. Inspection of the bladder showed no abnormality. Sensor wire was placed into the RIGHT kidney x 2. A dual lumen was used to place a second wire. Ureteral access sheath was attempted but could not pass the level of the iliacs because of tight ureter.  This was removed and a disposable ureteroscope was passed to the level of the stone after gentle pressure pushing the ureteroscope up the ureter. The stone was treated with 272 laser holmium fiber to dust. Some of the fragments flushed into the calyceal system but unable to inspect the calyceal system because the ureter was too tight to advance any further. A stent 6 x 26cm JJ stent was placed in standard fashion with stent attached. The bladder was emptied with the cystoscope. The patient was extubated and awoken and transferred to PACU in stable condition.       Electronically Signed by Fran Meier MD on 3/18/2022 at 7:29 PM

## 2022-03-18 NOTE — H&P
Update History & Physical    The Patient's History and Physical of March 15,   2022 was reviewed with the patient and I examined the patient. There was no change. The surgical site was confirmed by the patient and me. Plan:  The risk, benefits, expected outcome, and alternative to the recommended procedure have been discussed with the patient. Patient understands and wants to proceed with the procedure. Electronically signed by Tessa Yun MD on 3/18/2022 at [facility] [place] [address]  Tel: [phone]  Fax: [phone]    Patient :  Annabella Oliva  YOB: 1985  Birth Sex:  Male  Current Gender:  Male  Date:   03/15/2022 9:00 AM   Visit Type:    Office Visit  Assessment/Plan  #  Detail Type  Description   1. Assessment  Kidney stone (N20.0). Patient Plan  History of kidney stone. Right flank pain. Suspect possible passage of kidney stone. Will obtain imaging with CT scan of the abdomen and pelvis without IV contrast due to patient allergy to IV contrast dye. STAT. Need to rule out kidney stone or any other intra-abdominal process. Start patient empirically on antibiotics Keflex 500 mg b.i.d. 7 days. Plan Orders  The patient had the following order(s) completed today: CT Abdomen And Pelvis W/O DYE, Next Lab Date is. Obtained on 03/15/2022, Interpretation: See module and US Retroperitoneum Complete, Next Lab Date is 03/15/2022. Obtained on 03/15/2022, Interpretation: See module. 2.  Assessment  Lower abdominal pain (R10.30). 3.  Assessment  Feeling of incomplete bladder emptying (R39.14). Additional Visit Information      This 29year old male presents for Kidney and/or Ureteral Stone. History of Present Illness  1. Kidney and/or Ureteral Stone   Onset was sudden. It occurs intermittently. The problem is with no change. Location of pain is right flank. The pain does not radiate. Prior stone type of unknown. Pertinent history includes history of prior stone(s) and past lithotripsy. There are no aggravating factors. Relieving factors include analgesics. Pertinent negatives include chills, constipation, diarrhea, fever, nausea and vomiting. Additional information: 03/15/2022-patient ER visit [facility] AT [facility] 03/12/2022 noted to have a right flank pain and was treated with pain management. Labs reviewed WBC 13 CR 1.1, urine pH 5, nitrite and leukocyte esterase negative. Patient presents to the office today with complaint of persistent pain intermittent. No nausea vomiting. Low-grade temp 99°. Tolerating p.o.. History of kidney stones. No imaging done in the emergency room. .        Problem List  Problem Description  Onset Date  Chronic  Clinical Status  Notes  Nephrolithiasis  05/15/2015  N      HTN  05/15/2015  N        Medications (active prior to today)  Medication  Instructions  Start Date  Stop Date  Refilled  Elsewhere  Mapap (acetaminophen) 500 mg capsule  take 2 capsule by oral route  every 6 hours as needed  //      Y  Fioricet 50 mg-300 mg-40 mg capsule  take 1 - 2 capsule by oral route  every 4 hours as needed not to exceed 6 capsules per 24hrs  08/12/2016  03/15/2022    N  lisinopril 5 mg tablet  take 1 tablet by oral route  every day  08/19/2016  03/15/2022    N      Medication Reconciliation  Medications reconciled today. Medication Reviewed  Adherence  Medication Name  Sig Desc  Elsewhere  Status  taking as directed  Mapap (acetaminophen) 500 mg capsule  take 2 capsule by oral route  every 6 hours as needed  Y  Verified    Allergies  Ingredient  Reaction (Severity)  Medication Name  Comment  IVP Dye          Reviewed, no changes. Review of Systems  System  Neg/Pos  Details  Constitutional  Negative  Chills and Fever. ENMT  Negative  Ear infections and Sore throat. Eyes  Negative  Blurred vision, Double vision and Eye pain.   Respiratory  Negative  Asthma, Chronic cough, Dyspnea and Wheezing. Cardio  Negative  Chest pain. GI  Negative  Constipation, Decreased appetite, Diarrhea, Nausea and Vomiting. Endocrine  Negative  Cold intolerance, Heat intolerance, Increased thirst and Weight loss. Neuro  Negative  Headache and Tremors. Psych  Negative  Anxiety and Depression. Integumentary  Negative  Itching skin and Rash. MS  Negative  Back pain and Joint pain. Hema/Lymph  Negative  Easy bleeding. Reproductive  Negative  Sexual dysfunction. Vital Signs  Height  Time  ft  in  cm  Last Measured  Height Position  9:19 AM  5.0  11.00  180.34  10/17/2011  0    Weight/BSA/BMI  Time  lb  oz  kg  Context  BMI kg/m2  BSA m2  9:19 AM  240.00    108.862    33.47      Blood Pressure  Time  BP mm/Hg  Position  Side  Site  Method  Cuff Size  9:19 AM  163/100              Temperature/Pulse/Respiration  Time  Temp F  Temp C  Temp Site  Pulse/min  Pattern  Resp/ min  9:19 AM  97.60  36.44    65        Measured by  Time  Measured by  9:19 AM  Bob Alex Day    Physical Exam  Exam  Findings  Details  Constitutional  Normal  Well developed. Respiratory  Normal  Inspection - Normal.  Genitourinary  *  CVA Tenderness -RT. Musculoskeletal  Normal  Gait - Normal.  Neurological  Normal  Level of consciousness - Normal. Orientation - Normal. Memory - Normal.  Psychiatric  Normal  Oriented to time, place, person and situation. Appropriate mood and affect. Uroflow  Feeling of incomplete bladder emptying R39.14. Consent was obtained. The procedure and risks were explained in detail. Questions were encouraged and answered. Patient was prepped and draped in the usual fashion. Procedure   Sonographic residual urine: 20mL. Time after void: 30minutes. Comments:. Physician: Catrachito Sanford MD. Date: 03/15/2022. Time: 9:20 AM.    Post procedure  Instructions: Ambreen Shipman         Medications (added, continued, or stopped today)  Start Date  Medication  Directions  PRN Status  PRN Reason  Instruction  Stop Date  03/15/2022  cephalexin 500 mg capsule  take 1 capsule by oral route  every 12 hours  N        08/12/2016  Fioricet 50 mg-300 mg-40 mg capsule  take 1 - 2 capsule by oral route  every 4 hours as needed not to exceed 6 capsules per 24hrs  N  tension headache    03/15/2022  08/19/2016  lisinopril 5 mg tablet  take 1 tablet by oral route  every day  N      03/15/2022    Mapap (acetaminophen) 500 mg capsule  take 2 capsule by oral route  every 6 hours as needed  N            Active Patient Care Team Members  Name  Contact  Agency Type  Support Role  Relationship  Active Date  Inactive Date  [address]      Emergency Contact  Spouse        Arely Becerra      Patient provider  PCP        Melanie Conner      encounter provider              Provider:    Margarita Nelson MD 03/15/2022 1:04 PM    Document generated by:  Tariq Martinez 03/15/2022 01:04 PM      ----------------------------------------------------------------------------------------------------------------------------------------------------------------------      Electronically signed by Margarita Nelson MD on 03/15/2022 02:20 PM    Rashmi Mcleod MD)  3/18/2022 7:27:04 AM  CT scan:     1. Moderate right hydronephrosis secondary to a 4 x 5 x 5 mm calculus with a CT   attenuation of 472 Hounsfield units at the right ureteropelvic junction.

## 2022-03-18 NOTE — ANESTHESIA PREPROCEDURE EVALUATION
Relevant Problems   No relevant active problems       Anesthetic History     PONV          Review of Systems / Medical History  Patient summary reviewed, nursing notes reviewed and pertinent labs reviewed    Pulmonary          Smoker         Neuro/Psych         Psychiatric history     Cardiovascular                    Comments: POTS (postural orthostatic tachycardia syndrome)   GI/Hepatic/Renal     GERD: poorly controlled           Endo/Other  Within defined limits           Other Findings            Physical Exam    Airway  Mallampati: II  TM Distance: 4 - 6 cm  Neck ROM: normal range of motion   Mouth opening: Normal     Cardiovascular               Dental      Comments: Broken left upper molar   Pulmonary                 Abdominal         Other Findings            Anesthetic Plan    ASA: 2  Anesthesia type: general          Induction: Intravenous  Anesthetic plan and risks discussed with: Patient      Plan general anesthesia. Patient has no absolute contraindications to use of an LMA. Risks discussed and patient agrees with plan. GETA back-up if LMA placement unsuccessful. Anesthesia consent form reviewed. Patient given opportunity for questions and all questions answered. Anesthesia consent form signed by patient.

## 2022-05-02 NOTE — ANESTHESIA POSTPROCEDURE EVALUATION
Post-Anesthesia Evaluation and Assessment    Cardiovascular Function/Vital Signs  Visit Vitals  BP (!) 143/94   Pulse 84   Temp 36.6 °C (97.8 °F)   Resp 19   SpO2 95%       Patient is status post Procedure(s):  CYSTOSCOPY, RIGHT URETEROSCOPY LASER LITHOTRIPSY WITH HOLMIUM, RIGHT STENT PLACEMENT WITH C-ARM. Nausea/Vomiting: Controlled. Postoperative hydration reviewed and adequate. Pain:  Pain Scale 1: Numeric (0 - 10) (03/18/22 2003)  Pain Intensity 1: 0 (03/18/22 2003)   Managed. Neurological Status:   Neuro (WDL): Exceptions to WDL (03/18/22 2003)   At baseline. Mental Status and Level of Consciousness: Arousable. Pulmonary Status:   O2 Device: None (Room air) (03/18/22 2003)   Adequate oxygenation and airway patent. Complications related to anesthesia: None    Post-anesthesia assessment completed. No concerns. Patient has met all discharge requirements.     Signed By: Meek Shields MD    May 2, 2022

## 2023-01-20 ENCOUNTER — HOSPITAL ENCOUNTER (OUTPATIENT)
Dept: PREADMISSION TESTING | Age: 38
Discharge: HOME | End: 2023-01-20

## 2023-01-20 ENCOUNTER — HOSPITAL ENCOUNTER (OUTPATIENT)
Dept: LAB | Age: 38
Discharge: HOME | End: 2023-01-20

## 2023-01-20 ENCOUNTER — HOSPITAL ENCOUNTER (OUTPATIENT)
Dept: PREADMISSION TESTING | Age: 38
End: 2023-01-20
Payer: MEDICAID

## 2023-01-20 VITALS — HEIGHT: 71 IN | BODY MASS INDEX: 32.9 KG/M2 | WEIGHT: 235 LBS

## 2023-01-20 LAB
ATRIAL RATE: 70 BPM
CALCULATED P AXIS, ECG09: 53 DEGREES
CALCULATED R AXIS, ECG10: 35 DEGREES
CALCULATED T AXIS, ECG11: 48 DEGREES
DIAGNOSIS, 93000: NORMAL
P-R INTERVAL, ECG05: 146 MS
Q-T INTERVAL, ECG07: 388 MS
QRS DURATION, ECG06: 104 MS
QTC CALCULATION (BEZET), ECG08: 419 MS
VENTRICULAR RATE, ECG03: 70 BPM

## 2023-01-20 PROCEDURE — 93005 ELECTROCARDIOGRAM TRACING: CPT

## 2023-01-20 RX ORDER — KETOROLAC TROMETHAMINE 10 MG/1
10 TABLET, FILM COATED ORAL
Status: ON HOLD | COMMUNITY
End: 2023-01-24 | Stop reason: SDUPTHER

## 2023-01-20 RX ORDER — CEFAZOLIN SODIUM/WATER 2 G/20 ML
2 SYRINGE (ML) INTRAVENOUS ONCE
Status: CANCELLED | OUTPATIENT
Start: 2023-01-20 | End: 2023-01-20

## 2023-01-20 RX ORDER — TAMSULOSIN HYDROCHLORIDE 0.4 MG/1
0.4 CAPSULE ORAL DAILY
COMMUNITY

## 2023-01-20 RX ORDER — SODIUM CHLORIDE, SODIUM LACTATE, POTASSIUM CHLORIDE, CALCIUM CHLORIDE 600; 310; 30; 20 MG/100ML; MG/100ML; MG/100ML; MG/100ML
125 INJECTION, SOLUTION INTRAVENOUS CONTINUOUS
Status: CANCELLED | OUTPATIENT
Start: 2023-01-20

## 2023-01-24 ENCOUNTER — ANESTHESIA (OUTPATIENT)
Dept: SURGERY | Age: 38
End: 2023-01-24
Payer: MEDICAID

## 2023-01-24 ENCOUNTER — APPOINTMENT (OUTPATIENT)
Dept: GENERAL RADIOLOGY | Age: 38
End: 2023-01-24
Attending: UROLOGY
Payer: MEDICAID

## 2023-01-24 ENCOUNTER — ANESTHESIA EVENT (OUTPATIENT)
Dept: SURGERY | Age: 38
End: 2023-01-24
Payer: MEDICAID

## 2023-01-24 ENCOUNTER — HOSPITAL ENCOUNTER (OUTPATIENT)
Age: 38
Setting detail: OUTPATIENT SURGERY
Discharge: HOME | End: 2023-01-24
Attending: UROLOGY | Admitting: UROLOGY
Payer: MEDICAID

## 2023-01-24 VITALS
HEIGHT: 71 IN | RESPIRATION RATE: 20 BRPM | DIASTOLIC BLOOD PRESSURE: 103 MMHG | TEMPERATURE: 98.2 F | WEIGHT: 231.2 LBS | HEART RATE: 89 BPM | BODY MASS INDEX: 32.37 KG/M2 | OXYGEN SATURATION: 99 % | SYSTOLIC BLOOD PRESSURE: 151 MMHG

## 2023-01-24 DIAGNOSIS — N13.2 HYDRONEPHROSIS WITH RENAL AND URETERAL CALCULUS OBSTRUCTION: Primary | ICD-10-CM

## 2023-01-24 PROCEDURE — 74011000250 HC RX REV CODE- 250: Performed by: REGISTERED NURSE

## 2023-01-24 PROCEDURE — 74011250636 HC RX REV CODE- 250/636: Performed by: UROLOGY

## 2023-01-24 PROCEDURE — C2617 STENT, NON-COR, TEM W/O DEL: HCPCS | Performed by: UROLOGY

## 2023-01-24 PROCEDURE — 74011250636 HC RX REV CODE- 250/636: Performed by: REGISTERED NURSE

## 2023-01-24 PROCEDURE — 76210000020 HC REC RM PH II FIRST 0.5 HR: Performed by: UROLOGY

## 2023-01-24 PROCEDURE — C1758 CATHETER, URETERAL: HCPCS | Performed by: UROLOGY

## 2023-01-24 PROCEDURE — C1769 GUIDE WIRE: HCPCS | Performed by: UROLOGY

## 2023-01-24 PROCEDURE — 2709999900 HC NON-CHARGEABLE SUPPLY: Performed by: UROLOGY

## 2023-01-24 PROCEDURE — 77030040361 HC SLV COMPR DVT MDII -B: Performed by: UROLOGY

## 2023-01-24 PROCEDURE — 76010000149 HC OR TIME 1 TO 1.5 HR: Performed by: UROLOGY

## 2023-01-24 PROCEDURE — 77030020268 HC MISC GENERAL SUPPLY: Performed by: UROLOGY

## 2023-01-24 PROCEDURE — 77030020782 HC GWN BAIR PAWS FLX 3M -B: Performed by: UROLOGY

## 2023-01-24 PROCEDURE — 76210000016 HC OR PH I REC 1 TO 1.5 HR: Performed by: UROLOGY

## 2023-01-24 PROCEDURE — 77030012508 HC MSK AIRWY LMA AMBU -A: Performed by: ANESTHESIOLOGY

## 2023-01-24 PROCEDURE — 77030010103 HC SEAL PRT ENDOSC OCOA -B: Performed by: UROLOGY

## 2023-01-24 PROCEDURE — 76060000033 HC ANESTHESIA 1 TO 1.5 HR: Performed by: UROLOGY

## 2023-01-24 DEVICE — VARIABLE LENGTH URETERAL STENT
Type: IMPLANTABLE DEVICE | Site: URETER | Status: FUNCTIONAL
Brand: CONTOUR VL™

## 2023-01-24 RX ORDER — FENTANYL CITRATE 50 UG/ML
INJECTION, SOLUTION INTRAMUSCULAR; INTRAVENOUS AS NEEDED
Status: DISCONTINUED | OUTPATIENT
Start: 2023-01-24 | End: 2023-01-24

## 2023-01-24 RX ORDER — FENTANYL CITRATE 50 UG/ML
INJECTION, SOLUTION INTRAMUSCULAR; INTRAVENOUS AS NEEDED
Status: DISCONTINUED | OUTPATIENT
Start: 2023-01-24 | End: 2023-01-24 | Stop reason: HOSPADM

## 2023-01-24 RX ORDER — CEPHALEXIN 500 MG/1
500 CAPSULE ORAL 2 TIMES DAILY
Qty: 10 CAPSULE | Refills: 0 | Status: SHIPPED | OUTPATIENT
Start: 2023-01-24 | End: 2023-01-29

## 2023-01-24 RX ORDER — MIDAZOLAM HYDROCHLORIDE 1 MG/ML
INJECTION, SOLUTION INTRAMUSCULAR; INTRAVENOUS AS NEEDED
Status: DISCONTINUED | OUTPATIENT
Start: 2023-01-24 | End: 2023-01-24 | Stop reason: HOSPADM

## 2023-01-24 RX ORDER — ONDANSETRON 2 MG/ML
INJECTION INTRAMUSCULAR; INTRAVENOUS AS NEEDED
Status: DISCONTINUED | OUTPATIENT
Start: 2023-01-24 | End: 2023-01-24 | Stop reason: HOSPADM

## 2023-01-24 RX ORDER — LIDOCAINE HYDROCHLORIDE 20 MG/ML
INJECTION, SOLUTION EPIDURAL; INFILTRATION; INTRACAUDAL; PERINEURAL AS NEEDED
Status: DISCONTINUED | OUTPATIENT
Start: 2023-01-24 | End: 2023-01-24 | Stop reason: HOSPADM

## 2023-01-24 RX ORDER — SODIUM CHLORIDE, SODIUM LACTATE, POTASSIUM CHLORIDE, CALCIUM CHLORIDE 600; 310; 30; 20 MG/100ML; MG/100ML; MG/100ML; MG/100ML
125 INJECTION, SOLUTION INTRAVENOUS CONTINUOUS
Status: DISCONTINUED | OUTPATIENT
Start: 2023-01-24 | End: 2023-01-25 | Stop reason: HOSPADM

## 2023-01-24 RX ORDER — GLYCOPYRROLATE 0.2 MG/ML
INJECTION INTRAMUSCULAR; INTRAVENOUS AS NEEDED
Status: DISCONTINUED | OUTPATIENT
Start: 2023-01-24 | End: 2023-01-24 | Stop reason: HOSPADM

## 2023-01-24 RX ORDER — HYDROCODONE BITARTRATE AND ACETAMINOPHEN 5; 325 MG/1; MG/1
1 TABLET ORAL
Qty: 20 TABLET | Refills: 0 | Status: SHIPPED | OUTPATIENT
Start: 2023-01-24 | End: 2023-01-31

## 2023-01-24 RX ORDER — CEFAZOLIN SODIUM/WATER 2 G/20 ML
2 SYRINGE (ML) INTRAVENOUS ONCE
Status: COMPLETED | OUTPATIENT
Start: 2023-01-24 | End: 2023-01-24

## 2023-01-24 RX ORDER — PROPOFOL 10 MG/ML
INJECTION, EMULSION INTRAVENOUS AS NEEDED
Status: DISCONTINUED | OUTPATIENT
Start: 2023-01-24 | End: 2023-01-24 | Stop reason: HOSPADM

## 2023-01-24 RX ORDER — KETOROLAC TROMETHAMINE 10 MG/1
10 TABLET, FILM COATED ORAL
Qty: 20 TABLET | Refills: 0 | Status: SHIPPED | OUTPATIENT
Start: 2023-01-24

## 2023-01-24 RX ORDER — DEXAMETHASONE SODIUM PHOSPHATE 4 MG/ML
INJECTION, SOLUTION INTRA-ARTICULAR; INTRALESIONAL; INTRAMUSCULAR; INTRAVENOUS; SOFT TISSUE AS NEEDED
Status: DISCONTINUED | OUTPATIENT
Start: 2023-01-24 | End: 2023-01-24 | Stop reason: HOSPADM

## 2023-01-24 RX ADMIN — LIDOCAINE HYDROCHLORIDE 100 MG: 20 INJECTION, SOLUTION INTRAVENOUS at 16:57

## 2023-01-24 RX ADMIN — GLYCOPYRROLATE 0.2 MG: 0.2 INJECTION INTRAMUSCULAR; INTRAVENOUS at 17:06

## 2023-01-24 RX ADMIN — PROPOFOL 200 MG: 10 INJECTION, EMULSION INTRAVENOUS at 16:58

## 2023-01-24 RX ADMIN — ONDANSETRON HYDROCHLORIDE 4 MG: 2 INJECTION INTRAMUSCULAR; INTRAVENOUS at 17:07

## 2023-01-24 RX ADMIN — Medication 2 G: at 17:04

## 2023-01-24 RX ADMIN — DEXAMETHASONE SODIUM PHOSPHATE 4 MG: 4 INJECTION, SOLUTION INTRAMUSCULAR; INTRAVENOUS at 16:41

## 2023-01-24 RX ADMIN — FENTANYL CITRATE 50 MCG: 50 INJECTION, SOLUTION INTRAMUSCULAR; INTRAVENOUS at 16:55

## 2023-01-24 RX ADMIN — SODIUM CHLORIDE, POTASSIUM CHLORIDE, SODIUM LACTATE AND CALCIUM CHLORIDE 125 ML/HR: 600; 310; 30; 20 INJECTION, SOLUTION INTRAVENOUS at 14:59

## 2023-01-24 RX ADMIN — FENTANYL CITRATE 50 MCG: 50 INJECTION, SOLUTION INTRAMUSCULAR; INTRAVENOUS at 17:23

## 2023-01-24 RX ADMIN — MIDAZOLAM 2 MG: 1 INJECTION INTRAMUSCULAR; INTRAVENOUS at 16:54

## 2023-01-24 NOTE — ANESTHESIA PREPROCEDURE EVALUATION
Relevant Problems   No relevant active problems       Anesthetic History     PONV          Review of Systems / Medical History  Patient summary reviewed, nursing notes reviewed and pertinent labs reviewed    Pulmonary              Pertinent negatives: No sleep apnea and smoker  Comments: 2-3 cigs/ week abstained   Neuro/Psych         Psychiatric history     Cardiovascular            Dysrhythmias       Exercise tolerance: >4 METS  Comments: Postural orthostatic tachycardia syndrome   GI/Hepatic/Renal     GERD: well controlled        Pertinent negatives: No hiatal hernia   Endo/Other  Within defined limits           Other Findings              Physical Exam    Airway  Mallampati: I  TM Distance: > 6 cm  Neck ROM: normal range of motion   Mouth opening: Normal     Cardiovascular    Rhythm: regular  Rate: normal         Dental         Pulmonary  Breath sounds clear to auscultation               Abdominal  GI exam deferred       Other Findings            Anesthetic Plan    ASA: 2  Anesthesia type: general          Induction: Intravenous  Anesthetic plan and risks discussed with: Patient

## 2023-01-24 NOTE — DISCHARGE INSTRUCTIONS
DISCHARGE SUMMARY from Nurse    PATIENT INSTRUCTIONS:    After general anesthesia or intravenous sedation, for 24 hours or while taking prescription Narcotics:  Limit your activities  Do not drive and operate hazardous AAA  Do not make important personal or business decAisions  Do  not drink alcoholic beverages  If you have not urinated within 8 hours after discharge, please contact your surgeon on call. Report the following to your surgeon:  Excessive pain, swelling, redness or odor of or around the surgical area  Temperature over 100.5  Nausea and vomiting lasting longer than 4 hours or if unable to take medications  Any signs of decreased circulation or nerve impairment to extremity: change in color, persistent  numbness, tingling, coldness or increase pain  Any questions    What to do at Home:    If you experience any of the following symptoms ABOVE, please follow up with DR. Abundio Avila. *  Please give a list of your current medications to your Primary Care Provider. *  Please update this list whenever your medications are discontinued, doses are      changed, or new medications (including over-the-counter products) are added. *  Please carry medication information at all times in case of emergency situations. These are general instructions for a healthy lifestyle:    No smoking/ No tobacco products/ Avoid exposure to second hand smoke  Surgeon General's Warning:  Quitting smoking now greatly reduces serious risk to your health. Obesity, smoking, and sedentary lifestyle greatly increases your risk for illness    A healthy diet, regular physical exercise & weight monitoring are important for maintaining a healthy lifestyle    You may be retaining fluid if you have a history of heart failure or if you experience any of the following symptoms:  Weight gain of 3 pounds or more overnight or 5 pounds in a week, increased swelling in our hands or feet or shortness of breath while lying flat in bed. Please call your doctor as soon as you notice any of these symptoms; do not wait until your next office visit. The discharge information has been reviewed with the patient and caregiver. The patient and caregiver verbalized understanding. Discharge medications reviewed with the patient and caregiver and appropriate educational materials and side effects teaching were provided. Patient armband removed and shredded.     ___________________________________________________________________________________________________________________________________

## 2023-01-24 NOTE — H&P
Urology [facility]  [address] [facility] Primary [place] [address]  Tel: [phone]  Fax: [phone]    Patient : Ailin Bourgeois   YOB: 1985                   Assessment/Plan  # Detail Type Description    1. Assessment Right ureteral stone (N20.1). Patient Plan Patient continues to have his mm right ureteral stone. He has strain his urine 100% of the time and has not passed. He is taking his Flomax every day. Denies fever, chills, nausea or vomiting. Dr. Shelby Sutherland had stated in his last note that the patient should be considered to have ESWL if stone remains. Today patient I discussed this and he would rather have ureteroscopy. Last time he had a ESWL he did not tolerate the anesthesia well at all and this is definitive treatment. He lives with his grandfather In-Law who currently hospitalized and has Alzheimer's. He will be going to rehab either tomorrow or Monday the 23rd of January for 1 week. Patient is hopeful to be able to have his procedure done while his family members in rehab as when he gets home he would not be able to have the procedure for quite a while. Patient is 1 of his caregivers. Patient with history of POTS syndrome otherwise does not have any cardiac issues and no lung issues. Patient did test positive for COVID on approximately 12/21/2022. He took Paxlovid and had a negative test after finishing the Paxlovid but then tested positive again about a week later. Plan:  1. I spoke with Dr. Shelby Sutherland who does not have any time this coming week to do ureteroscopy and he asked that Dr. Herminia Evans perform surgery of possible at [facility]. Patient for a right ureteroscopy and possible right kidney ureteroscopy removal of existing kidney stones. Laser lithotripsy, basketing and right stent placement. 2. Patient has POTS syndrome but otherwise no other cardiac issues. No lung problems.     3. Continue tamsulosin and new order sent to pharmacy today continue straining urine 100% of the time. 2. Assessment Calculus of kidney (N20.0). Patient Plan  right-sided kidney stone multiple largest measuring approximately 5 mm. Additionally patient has a 6 mm obstructing proximal ureteral stone see ureteral stone impression. Plan Orders The patient had the following order(s) completed today: Abdominal/KUB 1 View, Next Lab Date is 01/19/2023. Obtained on 01/19/2023, Interpretation: See module. Additional Visit Information      This 40year old male presents for Kidney and/or Ureteral Stone. History of Present Illness  1. Kidney and/or Ureteral Stone   It occurs intermittently. The problem is improving. Location of pain is right flank. The pain radiates to the abdomen. Prior stone type of unknown. Pertinent history includes history of prior stone(s) and past lithotripsy. There are no aggravating factors. Relieving factors include analgesics. Pertinent negatives include chills, constipation, diarrhea, fever, nausea and vomiting. Additional information:   01/13/2023- patient recent ER visit 01/08/2023 CT scan showing a right proximal ureteral stone approximately 6 mm in additional stones in the right kidney multiple approximately 4-5 mm. Lt kidney 2mm stone. UA pH 6 nitrite negative. cr 1.1. Monica Graven Comments: 01/19/2023:  Patient returns urology clinic today for 1 week follow-up right your obstructing proximal ureteral stone. Stone is visible on KUB. Patient does have some stones also in the right lower pole that are visible on CT performed at March 15, 2022. Patient has been straining his urine 100% time has not passed kidney stone. Patient has been taking Flomax daily. Is not in much pain at all. Has not had any fever, chills, nausea vomiting.       Past Medical/Surgical History   (Reviewed, updated)  Disease/disorder Onset Date Management Date Comments     Cystoscopy, right ureteroscopy laser lithotripsy right stent placement with C-arm 2022    postural orthostatic tachycardia syndrome           Problem List  Problem List reviewed. Problem Description Onset Date Chronic Clinical Status Notes   Nephrolithiasis 05/15/2015 N     HTN 05/15/2015 N       Medications (active prior to today)  Medication Instructions Start Date Stop Date Refilled Elsewhere   Flomax take 1 capsule by oral route  every day 1/2 hour following the same meal each day // 2023 Y       Medication Reconciliation  Medications reconciled today. Allergies  Ingredient Reaction (Severity) Medication Name Comment   IVP Dye        Reviewed, no changes. Family History   (Reviewed, updated)    Relationship Family Member Name  Age at Death Condition Onset Age Cause of Death    grandfather   Family history of Cancer, prostate  N       Family history of Cancer, prostate  N       Family history of Bleeding tendencies  N   Father    Cancer, kidney  N     Social History  (Reviewed, updated)  Tobacco use reviewed. Preferred language is [place]. Marital Status/Family/Social Support  Marital status:      Children  Does not have children. Tobacco use status: Never smoked tobacco.    Smoking status: Never smoker. Tobacco Screening  Patient has never used tobacco. Patient has not used tobacco in the last 30 days. Patient has not used smokeless tobacco in the last 30 days. Alcohol  There is a history of alcohol use. consumed rarely. Caffeine  The patient does not use caffeine. Review of Systems  System Neg/Pos Details   Constitutional Negative Chills and Fever. ENMT Negative Ear infections and Sore throat. Eyes Negative Blurred vision, Double vision and Eye pain. Respiratory Negative Asthma, Chronic cough, Dyspnea and Wheezing. Cardio Negative Chest pain. GI Negative Constipation, Decreased appetite, Diarrhea, Nausea and Vomiting. Endocrine Negative Cold intolerance, Heat intolerance, Increased thirst and Weight loss. Neuro Negative Headache and Tremors. Psych Negative Anxiety and Depression. Integumentary Negative Itching skin and Rash. MS Negative Back pain and Joint pain. Hema/Lymph Negative Easy bleeding. Reproductive Negative Sexual dysfunction. Vital Signs   Height  Time ft in cm Last Measured Height Position   10:35 AM 5.0 11.00 180.34 10/17/2011 0     Weight/BSA/BMI  Time lb oz kg Context BMI kg/m2 BSA m2   10:35 .70  107.365 dressed with shoes 33.01      Measured by  Time Measured by   10:35 AM Shakira Calles     Physical Exam  Exam Findings Details   Constitutional Normal Well developed. Head/Face * Skull - normocephalic, atraumatic. Neck Exam Normal Inspection - Normal. Palpation - Normal. Thyroid gland - Normal.   Respiratory Normal Cough - Absent. Effort - Normal.   Vascular * Pulses - Radial: Right: normal, Left: normal.   Vascular Normal Capillary refill - Less than 2 seconds. Abdomen Normal CVA tenderness - None. No abdominal tenderness. Genitourinary Normal No CVA tenderness. No suprapubic tenderness. Neurological Normal Level of consciousness - Normal. Orientation - Normal. Memory - Normal. Cranial nerves - Cranial nerves II through XII grossly intact.  Balance & gait - Normal. Coordination - Normal.     Medications (added, continued, or stopped today)  Start Date Medication Directions PRN Status PRN Reason Instruction Stop Date    Flomax take 1 capsule by oral route  every day 1/2 hour following the same meal each day N   01/19/2023 01/19/2023 Flomax take 1 capsule by oral route  every day 1/2 hour following the same meal each day N   01/19/2023 01/19/2023 tamsulosin 0.4 mg capsule take 1 capsule by oral route  every day 1/2 hour following the same meal each day N          Active Patient Care Team Members  Name Contact Agency Type Support Role Relationship Active Date Inactive Date [address]   Emergency Contact Spouse      Maxkarine Wynneache   encounter provider Ashley Prater   Patient provider PCP          Provider:        Michail Schilder, MD

## 2023-01-24 NOTE — PERIOP NOTES
Pt. Used restroom in pre-op area with assistance. Patient placed on Piedad Paws for a minimum of 30 min in  Preop.

## 2023-01-24 NOTE — PERIOP NOTES
TRANSFER - IN REPORT:    Verbal report received from CRNA AND RN (name) on Bobie July  being received from OR (unit) for routine post - op      Report consisted of patients Situation, Background, Assessment and   Recommendations(SBAR). Information from the following report(s) SBAR, Kardex, OR Summary, Procedure Summary, Intake/Output, and MAR was reviewed with the receiving nurse. Opportunity for questions and clarification was provided. Assessment completed upon patients arrival to unit and care assumed.

## 2023-01-24 NOTE — BRIEF OP NOTE
Brief Postoperative Note    Patient: Ngozi Morris  YOB: 1985  MRN: [id number]    Date of Procedure: 1/24/2023     Pre-Op Diagnosis: RIGHT URETERAL STONE,  RIGHT RENAL STONE    Post-Op Diagnosis: Same as preoperative diagnosis. Procedure(s):  CYSTOSCOPY, RIGHT URETEROSCOPY WITH THULIUM LASER LITHOTRIPSY OF RIGHT URETERAL STONE AND RIGHT URETEROSCOPY AND LASER LITHOTRIPSY OF RIGHT RENAL STONE, RIGHT STENT PLACEMENT WITH C-ARM    Surgeon(s):  Alejandrina York MD    Surgical Assistant: NONE    Anesthesia: General     Estimated Blood Loss (mL): Minimal    Complications: None    Specimens: * No specimens in log *     Implants:   Implant Name Type Inv. Item Serial No.  Lot No. LRB No. Used Action   STENT URET 4.8FR [id number] PERCFLX HYDR+ SFT PGTL TAPR TIP - [id number]  STENT URET 4.8FR [id number] PERCFLX HYDR+ SFT PGTL TAPR TIP  [employer] [id number] 1 Implanted       Drains: * No LDAs found *    Findings: VERY TIGHT AREA OF SCAR IN PROXIMAL URETER JUST DISTAL TO UPJ. STONE WAS STUCK THERE. THERE WAS A STONE THAT WAS PARTIALLY SUBMUCOSAL IN THE RIGHT UPPER POLE THAT WAS LASERED AND A STONE IN THE LOWER POLE THAT WAS PARTIALLY SUBMUCOSAL THAT WAS LASERED.       Electronically Signed by Theresa Sanchez MD on 1/24/2023 at 5:54 PM

## 2023-01-25 NOTE — ANESTHESIA POSTPROCEDURE EVALUATION
Procedure(s):  CYSTOSCOPY, RIGHT RETROGRADE PYELOGRAM,RIGHT URETEROSCOPY WITH THULIUM LASER LITHOTRIPSY,RIGHT STENT PLACEMENT WITH C-ARM. general    Anesthesia Post Evaluation      Multimodal analgesia: multimodal analgesia not used between 6 hours prior to anesthesia start to PACU discharge  Patient location during evaluation: PACU  Patient participation: complete - patient participated  Level of consciousness: awake and alert  Pain score: 0  Airway patency: patent  Anesthetic complications: no  Cardiovascular status: acceptable  Respiratory status: acceptable  Hydration status: acceptable  Post anesthesia nausea and vomiting:  none  Final Post Anesthesia Temperature Assessment:  Normothermia (36.0-37.5 degrees C)      INITIAL Post-op Vital signs:   Vitals Value Taken Time   /88 01/24/23 1913   Temp 36.8 °C (98.2 °F) 01/24/23 1803   Pulse 85 01/24/23 1916   Resp 19 01/24/23 1916   SpO2 94 % 01/24/23 1916   Vitals shown include unvalidated device data.

## 2023-01-25 NOTE — OP NOTES
[facility]    Name:  China Abbott  MR#:   [id number]  :  1985  ACCOUNT #:  [de-identified]  DATE OF SERVICE:  2023    PREOPERATIVE DIAGNOSES:  Right ureteral stone and right renal stone. POSTOPERATIVE DIAGNOSES:  Right ureteral stone and right renal stone. PROCEDURE PERFORMED:  Cystoscopy, right ureteroscopy with thulium laser lithotripsy of right ureteral stone and right ureteroscopy with thulium laser lithotripsy of right renal stone and right stent placement. SURGEON:  Brando Carrera MD    ASSISTANT:  None. ANESTHESIA:  General.    COMPLICATIONS:  None. SPECIMENS REMOVED:  None. IMPLANTS: A  4.8-Malaysian variable-length stent with the string on. DRAINS:  None. ESTIMATED BLOOD LOSS:  Minimal.    FINDINGS:  The patient has a very tight ureter. Has an area of scar in the proximal ureter just distal to the UPJ. There was a stone that was stuck there. That stone was lasered into dust, and then there was a stone that was partially visible and partially submucosal that was lasered in the upper pole, and a stone that was partially visible and was partially submucosal in the lower pole that was also lasered in the right kidney. At the end of the case, all stone fragments were just dust and debris. The remainder of the ureter was moderately tight, but it was very tight at the level of just distal to the UPJ. A 4.8-Malaysian variable-length stent was placed with the string on. INDICATIONS:  The patient is a 60-year-old gentleman with a history of stones who was found to have a stone in the right ureter as well as some right renal stones. He presents for ureteroscopy. PROCEDURE:  Preoperatively, the risks and benefits of surgery were described to the patient. The risks include, but were not limited to bleeding, infection, injury to the bladder, injury to the ureter, injury to the kidney, possible need for future procedure to be made stone-free. The patient understood the risks and signed the informed consent. The patient was taken to the operating room and placed on the OR table in supine position. He was administered general anesthetic. He was administered intravenous antibiotics. He was placed in dorsal lithotomy position and prepped and draped in the usual sterile manner. A 22-Sami cystoscope and sheath were then inserted transurethrally atraumatically under direct vision using a 30-degree lens. Panendoscopy was done. Bilateral ureteral orifices were in normal anatomic position. There were no stones, no tumors, no areas of concern within the bladder. I cannulated the right ureteral orifice. A 5-Sami open-ended ureteral catheter and a wire were sent up to the kidney. 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  I removed the open-ended catheter. I then passed a semi-rigid ureteroscope transurethrally atraumatically under direct vision into the bladder and up the ureter, and I got all the way up towards the proximal ureter and I came upon the scar tissue. I could see that the stone was lodged in tight. The wire was going beyond that. I sent an other wire beyond that area, and I removed the semi-rigid ureteroscope. I then used one wire as a safety wire, the other was a working wire. Over the working wire, I passed a flexible ureteroscope over the wire up the ureter until I came upon the area of scar. I then removed the working wire. I came upon the stone that was lodged at the scar tissue near the UPJ. Using a 200-micron thulium laser fiber, I broke up the stone into smaller fragments and just dust and debris. Then, I went up into the kidney. Panpyeloscopy was done. Every calyx was entered. The calyces seemed somewhat blown out and widely spaced.   I found a stone in the upper pole that was partially extruding from the mucosa and partially submucosal.  Using the 200-micron thulium laser fiber, the stone was fragmented into small dust and debris. I then went to every calyx and I found another one in the same sort of fashion that was partially extruding through the mucosa and partially submucosal.  Using the 200-micron thulium laser fiber, I broke up that stone into small fragments as well. All the fragments were just dust and debris and nothing was significant enough to grab out with the basket. I then slowly backed the scope down the ureter. The ureter was intact and unharmed. I then reinserted the cystoscope. Over the safety wire, I passed a 4.8-Khmer variable-length stent with the string on. The wire was removed. Fluoroscopy confirmed the proximal coil within the kidney. The distal coil was noted to be in the bladder by direct vision. At this point, the patient was taken out of lithotomy position, revived from anesthesia, taken to Recovery in stable condition.       MD MINA [email or web address]  D:  01/24/2023 18:01  T:  01/25/2023 3:53  JOB #:  [id number]

## (undated) DEVICE — STRAP,POSITIONING,KNEE/BODY,FOAM,4X60": Brand: MEDLINE

## (undated) DEVICE — TRAP MUCUS SPECIMEN 40ML -- MEDICHOICE

## (undated) DEVICE — SOLUTION IRRIG 3000ML 0.9% SOD CHL FLX CONT 0797208] ICU MEDICAL INC]

## (undated) DEVICE — DUAL LUMEN URETERAL CATHETER

## (undated) DEVICE — STRIP SKIN CLSR W1XL5IN NYL REINF CURAD

## (undated) DEVICE — CATHETER URET L70CM OD6FR OPN END FLEXIMA

## (undated) DEVICE — CYSTO PACK: Brand: MEDLINE INDUSTRIES, INC.

## (undated) DEVICE — GARMENT,MEDLINE,DVT,INT,CALF,MED, GEN2: Brand: MEDLINE

## (undated) DEVICE — DRAPE XR C ARM 41X74IN LF --

## (undated) DEVICE — ADHESIVE LIQ H2O INSOLUBLE 3 CC LO RISK N STN MASTISOL

## (undated) DEVICE — BAG PRESSURE INFUSION 3 W STOPCOCK 3000 CC PREMIERPRO DISP

## (undated) DEVICE — CATH URET 5FRX70CM W/OPN END -- BX/20

## (undated) DEVICE — SINGLE-USE DIGITAL FLEXIBLE URETEROSCOPE: Brand: LITHOVUE

## (undated) DEVICE — DRAPE C ARM UNIV W41XL74IN CLR PLAS XR VELC CLSR POLY STRP

## (undated) DEVICE — GDWIRE 3CM FLX-TIP 0.038X150CM -- BX/5 SENSOR

## (undated) DEVICE — GUIDEWIRE UROLOGICAL STR 3 CM 0.038 INX150 CM DUAL-FLEX

## (undated) DEVICE — URETERAL ACCESS SHEATH SET: Brand: NAVIGATOR HD

## (undated) DEVICE — GLOVE ORANGE PI 7 1/2   MSG9075

## (undated) DEVICE — Device

## (undated) DEVICE — SEAL ENDOSCP INSTR 7FR BX SELF SEAL